# Patient Record
Sex: MALE | Race: WHITE | NOT HISPANIC OR LATINO | Employment: OTHER | URBAN - METROPOLITAN AREA
[De-identification: names, ages, dates, MRNs, and addresses within clinical notes are randomized per-mention and may not be internally consistent; named-entity substitution may affect disease eponyms.]

---

## 2017-02-02 ENCOUNTER — OFFICE VISIT (OUTPATIENT)
Dept: FAMILY MEDICINE | Facility: CLINIC | Age: 27
End: 2017-02-02
Payer: COMMERCIAL

## 2017-02-02 VITALS
SYSTOLIC BLOOD PRESSURE: 110 MMHG | WEIGHT: 235.1 LBS | OXYGEN SATURATION: 99 % | HEART RATE: 94 BPM | TEMPERATURE: 98.4 F | DIASTOLIC BLOOD PRESSURE: 60 MMHG | HEIGHT: 73 IN | BODY MASS INDEX: 31.16 KG/M2

## 2017-02-02 DIAGNOSIS — F98.8 ATTENTION DEFICIT DISORDER: Primary | ICD-10-CM

## 2017-02-02 DIAGNOSIS — F43.21 ADJUSTMENT DISORDER WITH DEPRESSED MOOD: ICD-10-CM

## 2017-02-02 PROCEDURE — 99213 OFFICE O/P EST LOW 20 MIN: CPT | Performed by: FAMILY MEDICINE

## 2017-02-02 RX ORDER — DEXTROAMPHETAMINE SACCHARATE, AMPHETAMINE ASPARTATE MONOHYDRATE, DEXTROAMPHETAMINE SULFATE AND AMPHETAMINE SULFATE 7.5; 7.5; 7.5; 7.5 MG/1; MG/1; MG/1; MG/1
30 CAPSULE, EXTENDED RELEASE ORAL DAILY
Qty: 30 CAPSULE | Refills: 0 | Status: SHIPPED | OUTPATIENT
Start: 2017-03-05 | End: 2017-04-04

## 2017-02-02 RX ORDER — DEXTROAMPHETAMINE SACCHARATE, AMPHETAMINE ASPARTATE MONOHYDRATE, DEXTROAMPHETAMINE SULFATE AND AMPHETAMINE SULFATE 7.5; 7.5; 7.5; 7.5 MG/1; MG/1; MG/1; MG/1
30 CAPSULE, EXTENDED RELEASE ORAL DAILY
Qty: 30 CAPSULE | Refills: 0 | Status: SHIPPED | OUTPATIENT
Start: 2017-04-05 | End: 2017-05-05

## 2017-02-02 RX ORDER — BUPROPION HYDROCHLORIDE 300 MG/1
300 TABLET ORAL EVERY MORNING
Qty: 30 TABLET | Refills: 11 | Status: SHIPPED | OUTPATIENT
Start: 2017-02-02 | End: 2017-12-11

## 2017-02-02 RX ORDER — DEXTROAMPHETAMINE SACCHARATE, AMPHETAMINE ASPARTATE MONOHYDRATE, DEXTROAMPHETAMINE SULFATE AND AMPHETAMINE SULFATE 7.5; 7.5; 7.5; 7.5 MG/1; MG/1; MG/1; MG/1
30 CAPSULE, EXTENDED RELEASE ORAL DAILY
Qty: 30 CAPSULE | Refills: 0 | Status: SHIPPED | OUTPATIENT
Start: 2017-02-02 | End: 2017-03-04

## 2017-02-02 NOTE — PROGRESS NOTES
"  SUBJECTIVE:                                                    All Barboza is a 26 year old male who presents to clinic today for the following health issues:    Medication Followup of Adderall 30 mg and Wellbutrin 300 mg    Taking Medication as prescribed: yes    Side Effects:  None    Medication Helping Symptoms:  yes       30mg daily does not seem to last as long  Insurance wont pay for 15mg BID    We tried switching him to Vyvanse but this was considerably more expensive    PHQ-9 SCORE 7/15/2016 11/3/2016 2/2/2017   Total Score - - -   Total Score 11 13 5       Family history of depression      Aggravating factors include: Attention to task  Relieving factors include: Past history of use of 15 mg long-acting Adderall twice a day  Records and U MP from 2011  Activity level/function:              Daily activities:  Able to do all daily activities            Work:  not applicable  Recent family or social stressors:  none noted  Psychologist:  No    History of chemical dependency:  No  Sleep:  Good  Depression/mood issues:  No  Anxiety issues YES     Problem list and histories reviewed & adjusted, as indicated.  Additional history: as documented    Patient Active Problem List   Diagnosis     Attention deficit disorder     No past surgical history on file.    Social History   Substance Use Topics     Smoking status: Never Smoker      Smokeless tobacco: Never Used     Alcohol Use: 0.0 oz/week     0 Standard drinks or equivalent per week      Comment: social      Family History   Problem Relation Age of Onset     Breast Cancer Mother      Depression Mother            ROS:  PSYCHIATRIC: NEGATIVE for changes in mood or affect  ROS otherwise negative    OBJECTIVE:                                                    /60 mmHg  Pulse 94  Temp(Src) 98.4  F (36.9  C) (Oral)  Ht 6' 1\" (1.854 m)  Wt 235 lb 1.6 oz (106.641 kg)  BMI 31.02 kg/m2  SpO2 99%  Body mass index is 31.02 kg/(m^2).  GENERAL: healthy, alert " and no distress  MS: no gross musculoskeletal defects noted, no edema    Diagnostic Test Results:  none      ASSESSMENT/PLAN:                                                      Problem List Items Addressed This Visit     Attention deficit disorder - Primary    Relevant Medications    amphetamine-dextroamphetamine (ADDERALL XR) 30 MG per 24 hr capsule    amphetamine-dextroamphetamine (ADDERALL XR) 30 MG per 24 hr capsule (Start on 3/5/2017)    amphetamine-dextroamphetamine (ADDERALL XR) 30 MG per 24 hr capsule (Start on 4/5/2017)      Other Visit Diagnoses     Adjustment disorder with depressed mood         Relevant Medications     buPROPion (WELLBUTRIN XL) 300 MG 24 hr tablet         Stable attention deficit disorder, back on Adderall    refill Medication for depression symptoms    Recheck in 4-12 weeks or as needed    Bj Correia MD  [facility]

## 2017-02-03 ASSESSMENT — PATIENT HEALTH QUESTIONNAIRE - PHQ9: SUM OF ALL RESPONSES TO PHQ QUESTIONS 1-9: 5

## 2017-08-25 ENCOUNTER — OFFICE VISIT (OUTPATIENT)
Dept: FAMILY MEDICINE | Facility: CLINIC | Age: 27
End: 2017-08-25
Payer: COMMERCIAL

## 2017-08-25 VITALS
HEART RATE: 96 BPM | TEMPERATURE: 98.7 F | DIASTOLIC BLOOD PRESSURE: 60 MMHG | WEIGHT: 236 LBS | RESPIRATION RATE: 16 BRPM | HEIGHT: 73 IN | OXYGEN SATURATION: 98 % | BODY MASS INDEX: 31.28 KG/M2 | SYSTOLIC BLOOD PRESSURE: 110 MMHG

## 2017-08-25 DIAGNOSIS — F98.8 ATTENTION DEFICIT DISORDER, UNSPECIFIED HYPERACTIVITY PRESENCE: Primary | ICD-10-CM

## 2017-08-25 DIAGNOSIS — F41.1 GENERALIZED ANXIETY DISORDER: ICD-10-CM

## 2017-08-25 PROCEDURE — 99214 OFFICE O/P EST MOD 30 MIN: CPT | Performed by: FAMILY MEDICINE

## 2017-08-25 RX ORDER — DEXTROAMPHETAMINE SACCHARATE, AMPHETAMINE ASPARTATE MONOHYDRATE, DEXTROAMPHETAMINE SULFATE AND AMPHETAMINE SULFATE 7.5; 7.5; 7.5; 7.5 MG/1; MG/1; MG/1; MG/1
30 CAPSULE, EXTENDED RELEASE ORAL DAILY
Qty: 30 CAPSULE | Refills: 0 | Status: SHIPPED | OUTPATIENT
Start: 2017-09-25 | End: 2017-10-25

## 2017-08-25 RX ORDER — DEXTROAMPHETAMINE SACCHARATE, AMPHETAMINE ASPARTATE MONOHYDRATE, DEXTROAMPHETAMINE SULFATE AND AMPHETAMINE SULFATE 7.5; 7.5; 7.5; 7.5 MG/1; MG/1; MG/1; MG/1
30 CAPSULE, EXTENDED RELEASE ORAL DAILY
Qty: 30 CAPSULE | Refills: 0 | Status: SHIPPED | OUTPATIENT
Start: 2017-08-25 | End: 2017-09-24

## 2017-08-25 RX ORDER — DEXTROAMPHETAMINE SACCHARATE, AMPHETAMINE ASPARTATE MONOHYDRATE, DEXTROAMPHETAMINE SULFATE AND AMPHETAMINE SULFATE 7.5; 7.5; 7.5; 7.5 MG/1; MG/1; MG/1; MG/1
30 CAPSULE, EXTENDED RELEASE ORAL DAILY
Qty: 30 CAPSULE | Refills: 0 | Status: SHIPPED | OUTPATIENT
Start: 2017-10-26 | End: 2017-11-25

## 2017-08-25 RX ORDER — CITALOPRAM HYDROBROMIDE 20 MG/1
20 TABLET ORAL AT BEDTIME
Qty: 30 TABLET | Refills: 3 | Status: SHIPPED | OUTPATIENT
Start: 2017-08-25 | End: 2017-12-11

## 2017-08-25 ASSESSMENT — PATIENT HEALTH QUESTIONNAIRE - PHQ9: SUM OF ALL RESPONSES TO PHQ QUESTIONS 1-9: 11

## 2017-08-25 NOTE — NURSING NOTE
"Chief Complaint   Patient presents with     A.D.H.D     Depression     initial /60 (BP Location: Right arm, Cuff Size: Adult Large)  Pulse 96  Temp 98.7  F (37.1  C) (Oral)  Resp 16  Ht 6' 1\" (1.854 m)  Wt 236 lb (107 kg)  SpO2 98%  BMI 31.14 kg/m2 Estimated body mass index is 31.14 kg/(m^2) as calculated from the following:    Height as of this encounter: 6' 1\" (1.854 m).    Weight as of this encounter: 236 lb (107 kg).  BP completed using cuff size: large.   R arm      Health Maintenance that is potentially due pending provider review:  NONE    n/a    Alexis Back ma  "

## 2017-08-25 NOTE — MR AVS SNAPSHOT
After Visit Summary   8/25/2017    All Barboza    MRN: [id number]           Patient Information     Date Of Birth          1990        Visit Information        Provider Department      8/25/2017 3:00 PM Bj Correia MD [facility]        Today's Diagnoses     Attention deficit disorder, unspecified hyperactivity presence    -  1    Generalized anxiety disorder           Follow-ups after your visit        Follow-up notes from your care team     Return in about 3 months (around 11/25/2017).      Who to contact     If you have questions or need follow up information about today's clinic visit or your schedule please contact [facility] directly at [phone].  Normal or non-critical lab and imaging results will be communicated to you by MyChart, letter or phone within 4 business days after the clinic has received the results. If you do not hear from us within 7 days, please contact the clinic through [employer] or phone. If you have a critical or abnormal lab result, we will notify you by phone as soon as possible.  Submit refill requests through [employer] or call your pharmacy and they will forward the refill request to us. Please allow 3 business days for your refill to be completed.          Additional Information About Your Visit        MyChart Information     [employer] gives you secure access to your electronic health record. If you see a primary care provider, you can also send messages to your care team and make appointments. If you have questions, please call your primary care clinic.  If you do not have a primary care provider, please call [phone] and they will assist you.        Care EveryWhere ID     This is your Care EveryWhere ID. This could be used by other organizations to access your [place] medical records  [id number]        Your Vitals Were     Pulse Temperature Respirations Height Pulse Oximetry BMI (Body Mass Index)    96 98.7  F (37.1  C)  "(Oral) 16 6' 1\" (1.854 m) 98% 31.14 kg/m2       Blood Pressure from Last 3 Encounters:   08/25/17 110/60   02/02/17 110/60   11/03/16 132/73    Weight from Last 3 Encounters:   08/25/17 236 lb (107 kg)   02/02/17 235 lb 1.6 oz (106.6 kg)   11/03/16 224 lb 8 oz (101.8 kg)              Today, you had the following     No orders found for display         Today's Medication Changes          These changes are accurate as of: 8/25/17  4:09 PM.  If you have any questions, ask your nurse or doctor.               Start taking these medicines.        Dose/Directions    * amphetamine-dextroamphetamine 30 MG per 24 hr capsule   Commonly known as:  ADDERALL XR   Used for:  Attention deficit disorder, unspecified hyperactivity presence   Started by:  Bj Correia MD        Dose:  30 mg   Take 1 capsule (30 mg) by mouth daily   Quantity:  30 capsule   Refills:  0       * amphetamine-dextroamphetamine 30 MG per 24 hr capsule   Commonly known as:  ADDERALL XR   Used for:  Attention deficit disorder, unspecified hyperactivity presence   Started by:  Bj Correia MD        Dose:  30 mg   Start taking on:  9/25/2017   Take 1 capsule (30 mg) by mouth daily   Quantity:  30 capsule   Refills:  0       * amphetamine-dextroamphetamine 30 MG per 24 hr capsule   Commonly known as:  ADDERALL XR   Used for:  Attention deficit disorder, unspecified hyperactivity presence   Started by:  Bj Correia MD        Dose:  30 mg   Start taking on:  10/26/2017   Take 1 capsule (30 mg) by mouth daily   Quantity:  30 capsule   Refills:  0       citalopram 20 MG tablet   Commonly known as:  celeXA   Used for:  Generalized anxiety disorder   Started by:  Bj Correia MD        Dose:  20 mg   Take 1 tablet (20 mg) by mouth At Bedtime   Quantity:  30 tablet   Refills:  3       * Notice:  This list has 3 medication(s) that are the same as other medications prescribed for you. Read the directions carefully, and ask " your doctor or other care provider to review them with you.         Where to get your medicines      These medications were sent to [id number] - [facility] [address]  [address] [address]     Phone:  [phone]     citalopram 20 MG tablet         Some of these will need a paper prescription and others can be bought over the counter.  Ask your nurse if you have questions.     Bring a paper prescription for each of these medications     amphetamine-dextroamphetamine 30 MG per 24 hr capsule    amphetamine-dextroamphetamine 30 MG per 24 hr capsule    amphetamine-dextroamphetamine 30 MG per 24 hr capsule                Primary Care Provider Office Phone # Fax #    Bj Markus Correia [phone] [address] [address]        Equal Access to Services     KING REZA : Hadii erinn white hadasho Somita, waaxda luqadaha, qaybta kaalmada adeegyada, dillon marie . So [facility] [phone].    ATENCIÓN: Si habla español, tiene a kathleen disposición servicios gratuitos de asistencia lingüística. Josr al [phone].    We comply with applicable federal civil rights laws and [place] laws. We do not discriminate on the basis of race, color, national origin, age, disability sex, sexual orientation or gender identity.            Thank you!     Thank you for choosing [facility]  for your care. Our goal is always to provide you with excellent care. Hearing back from our patients is one way we can continue to improve our services. Please take a few minutes to complete the written survey that you may receive in the mail after your visit with us. Thank you!             Your Updated Medication List - Protect others around you: Learn how to safely use, store and throw away your medicines at www.disposemymeds.org.          This list is accurate as of: 8/25/17  4:09 PM.  Always use your  most recent med list.                   Brand Name Dispense Instructions for use Diagnosis    * amphetamine-dextroamphetamine 30 MG per 24 hr capsule    ADDERALL XR    30 capsule    Take 1 capsule (30 mg) by mouth daily    Attention deficit disorder, unspecified hyperactivity presence       * amphetamine-dextroamphetamine 30 MG per 24 hr capsule   Start taking on:  9/25/2017    ADDERALL XR    30 capsule    Take 1 capsule (30 mg) by mouth daily    Attention deficit disorder, unspecified hyperactivity presence       * amphetamine-dextroamphetamine 30 MG per 24 hr capsule   Start taking on:  10/26/2017    ADDERALL XR    30 capsule    Take 1 capsule (30 mg) by mouth daily    Attention deficit disorder, unspecified hyperactivity presence       buPROPion 300 MG 24 hr tablet    WELLBUTRIN XL    30 tablet    Take 1 tablet (300 mg) by mouth every morning    Adjustment disorder with depressed mood       citalopram 20 MG tablet    celeXA    30 tablet    Take 1 tablet (20 mg) by mouth At Bedtime    Generalized anxiety disorder       * Notice:  This list has 3 medication(s) that are the same as other medications prescribed for you. Read the directions carefully, and ask your doctor or other care provider to review them with you.

## 2017-08-25 NOTE — PROGRESS NOTES
"  SUBJECTIVE:   All Barboza is a 27 year old male who presents to clinic today for the following health issues:      ADHD, and wellbutrin refill  Medication Followup of Adderall 30 mg and Wellbutrin 300 mg    Taking Medication as prescribed: yes    Side Effects:  None    Medication Helping Symptoms:  yes     30mg daily does not seem to last as long  Insurance wont pay for 15mg BID    We tried switching him to Vyvanse but this was considerably more expensive    PHQ-9 SCORE 7/15/2016 11/3/2016 2/2/2017   Total Score - - -   Total Score 11 13 5       Family history of depression  Worse anxiety recently, stopped Wellbutrin because he didn't think it was helpful      Aggravating factors include: Attention to task  Relieving factors include: Past history of use of 15 mg long-acting Adderall twice a day  Records and U MP from 2011  Activity level/function:              Daily activities:  Able to do all daily activities            Work:  not applicable  Recent family or social stressors:  none noted  Psychologist:  No    History of chemical dependency:  No  Sleep:  Good  Depression/mood issues:  No  Anxiety issues YES     Problem list and histories reviewed & adjusted, as indicated.  Additional history: as documented    Patient Active Problem List   Diagnosis     Attention deficit disorder     No past surgical history on file.    Social History   Substance Use Topics     Smoking status: Never Smoker     Smokeless tobacco: Never Used     Alcohol use 0.0 oz/week     0 Standard drinks or equivalent per week      Comment: social      Family History   Problem Relation Age of Onset     Breast Cancer Mother      Depression Mother            ROS:  PSYCHIATRIC: NEGATIVE for changes in mood or affect  ROS otherwise negative    OBJECTIVE:                                                    /60 (BP Location: Right arm, Cuff Size: Adult Large)  Pulse 96  Temp 98.7  F (37.1  C) (Oral)  Resp 16  Ht 6' 1\" (1.854 m)  Wt 236 lb " (107 kg)  SpO2 98%  BMI 31.14 kg/m2  Body mass index is 31.14 kg/(m^2).  GENERAL: healthy, alert and no distress  MS: no gross musculoskeletal defects noted, no edema  anxious  Diagnostic Test Results:  none      ASSESSMENT/PLAN:                                                      Problem List Items Addressed This Visit     None        Stable attention deficit disorder, back on Adderall    Try new med for anxiety symptoms    Recheck in 4-12 weeks or as needed    Bj Correia MD  [facility]

## 2017-12-11 ENCOUNTER — OFFICE VISIT (OUTPATIENT)
Dept: FAMILY MEDICINE | Facility: CLINIC | Age: 27
End: 2017-12-11
Payer: COMMERCIAL

## 2017-12-11 VITALS
HEART RATE: 92 BPM | RESPIRATION RATE: 15 BRPM | WEIGHT: 233.6 LBS | OXYGEN SATURATION: 98 % | BODY MASS INDEX: 30.96 KG/M2 | DIASTOLIC BLOOD PRESSURE: 68 MMHG | TEMPERATURE: 98.4 F | HEIGHT: 73 IN | SYSTOLIC BLOOD PRESSURE: 114 MMHG

## 2017-12-11 DIAGNOSIS — F98.8 ATTENTION DEFICIT DISORDER, UNSPECIFIED HYPERACTIVITY PRESENCE: Primary | ICD-10-CM

## 2017-12-11 DIAGNOSIS — F41.9 ANXIETY: ICD-10-CM

## 2017-12-11 PROCEDURE — 99213 OFFICE O/P EST LOW 20 MIN: CPT | Performed by: FAMILY MEDICINE

## 2017-12-11 RX ORDER — DEXTROAMPHETAMINE SACCHARATE, AMPHETAMINE ASPARTATE MONOHYDRATE, DEXTROAMPHETAMINE SULFATE AND AMPHETAMINE SULFATE 7.5; 7.5; 7.5; 7.5 MG/1; MG/1; MG/1; MG/1
30 CAPSULE, EXTENDED RELEASE ORAL DAILY
Qty: 30 CAPSULE | Refills: 0 | Status: SHIPPED | OUTPATIENT
Start: 2017-12-11 | End: 2018-01-10

## 2017-12-11 RX ORDER — DEXTROAMPHETAMINE SACCHARATE, AMPHETAMINE ASPARTATE MONOHYDRATE, DEXTROAMPHETAMINE SULFATE AND AMPHETAMINE SULFATE 7.5; 7.5; 7.5; 7.5 MG/1; MG/1; MG/1; MG/1
30 CAPSULE, EXTENDED RELEASE ORAL DAILY
Qty: 30 CAPSULE | Refills: 0 | Status: SHIPPED | OUTPATIENT
Start: 2018-02-11 | End: 2018-03-13

## 2017-12-11 RX ORDER — DEXTROAMPHETAMINE SACCHARATE, AMPHETAMINE ASPARTATE MONOHYDRATE, DEXTROAMPHETAMINE SULFATE AND AMPHETAMINE SULFATE 7.5; 7.5; 7.5; 7.5 MG/1; MG/1; MG/1; MG/1
30 CAPSULE, EXTENDED RELEASE ORAL DAILY
Qty: 30 CAPSULE | Refills: 0 | Status: SHIPPED | OUTPATIENT
Start: 2018-01-11 | End: 2018-02-10

## 2017-12-11 NOTE — MR AVS SNAPSHOT
"              After Visit Summary   12/11/2017    All Barboza    MRN: [id number]           Patient Information     Date Of Birth          1990        Visit Information        Provider Department      12/11/2017 3:30 PM Bj Correia MD [facility]        Today's Diagnoses     Attention deficit disorder, unspecified hyperactivity presence    -  1    Anxiety           Follow-ups after your visit        Who to contact     If you have questions or need follow up information about today's clinic visit or your schedule please contact [facility] directly at [phone].  Normal or non-critical lab and imaging results will be communicated to you by [employer], letter or phone within 4 business days after the clinic has received the results. If you do not hear from us within 7 days, please contact the clinic through [employer] or phone. If you have a critical or abnormal lab result, we will notify you by phone as soon as possible.  Submit refill requests through [employer] or call your pharmacy and they will forward the refill request to us. Please allow 3 business days for your refill to be completed.          Additional Information About Your Visit        MyChart Information     [employer] gives you secure access to your electronic health record. If you see a primary care provider, you can also send messages to your care team and make appointments. If you have questions, please call your primary care clinic.  If you do not have a primary care provider, please call [phone] and they will assist you.        Care EveryWhere ID     This is your Care EveryWhere ID. This could be used by other organizations to access your [place] medical records  [id number]        Your Vitals Were     Pulse Temperature Respirations Height Pulse Oximetry BMI (Body Mass Index)    92 98.4  F (36.9  C) (Oral) 15 6' 1\" (1.854 m) 98% 30.82 kg/m2       Blood Pressure from Last 3 Encounters:   12/11/17 114/68 "   08/25/17 110/60   02/02/17 110/60    Weight from Last 3 Encounters:   12/11/17 233 lb 9.6 oz (106 kg)   08/25/17 236 lb (107 kg)   02/02/17 235 lb 1.6 oz (106.6 kg)              Today, you had the following     No orders found for display         Today's Medication Changes          These changes are accurate as of: 12/11/17  3:53 PM.  If you have any questions, ask your nurse or doctor.               Start taking these medicines.        Dose/Directions    * amphetamine-dextroamphetamine 30 MG per 24 hr capsule   Commonly known as:  ADDERALL XR   Used for:  Attention deficit disorder, unspecified hyperactivity presence   Started by:  Bj Correia MD        Dose:  30 mg   Take 1 capsule (30 mg) by mouth daily   Quantity:  30 capsule   Refills:  0       * amphetamine-dextroamphetamine 30 MG per 24 hr capsule   Commonly known as:  ADDERALL XR   Used for:  Attention deficit disorder, unspecified hyperactivity presence   Started by:  Bj Correia MD        Dose:  30 mg   Start taking on:  1/11/2018   Take 1 capsule (30 mg) by mouth daily   Quantity:  30 capsule   Refills:  0       * amphetamine-dextroamphetamine 30 MG per 24 hr capsule   Commonly known as:  ADDERALL XR   Used for:  Attention deficit disorder, unspecified hyperactivity presence   Started by:  Bj Correia MD        Dose:  30 mg   Start taking on:  2/11/2018   Take 1 capsule (30 mg) by mouth daily   Quantity:  30 capsule   Refills:  0       * Notice:  This list has 3 medication(s) that are the same as other medications prescribed for you. Read the directions carefully, and ask your doctor or other care provider to review them with you.      Stop taking these medicines if you haven't already. Please contact your care team if you have questions.     buPROPion 300 MG 24 hr tablet   Commonly known as:  WELLBUTRIN XL   Stopped by:  Bj Correia MD           citalopram 20 MG tablet   Commonly known as:  celeXA    Stopped by:  Bj Correia MD                Where to get your medicines      Some of these will need a paper prescription and others can be bought over the counter.  Ask your nurse if you have questions.     Bring a paper prescription for each of these medications     amphetamine-dextroamphetamine 30 MG per 24 hr capsule    amphetamine-dextroamphetamine 30 MG per 24 hr capsule    amphetamine-dextroamphetamine 30 MG per 24 hr capsule                Primary Care Provider Office Phone # Fax #    Bj Markus Correia [phone] [address] [address]        Equal Access to Services     [facility]: Hadii aad ku hadasho Soomaali, waaxda luqadaha, qaybta kaalmada adeegyada, waxay nitain hayaan adeeg khpuma marie . So [facility] [phone].    ATENCIÓN: Si habla español, tiene a kathleen disposición servicios gratuitos de asistencia lingüística. [facility] [phone].    We comply with applicable federal civil rights laws and [place] laws. We do not discriminate on the basis of race, color, national origin, age, disability, sex, sexual orientation, or gender identity.            Thank you!     Thank you for choosing [facility]  for your care. Our goal is always to provide you with excellent care. Hearing back from our patients is one way we can continue to improve our services. Please take a few minutes to complete the written survey that you may receive in the mail after your visit with us. Thank you!             Your Updated Medication List - Protect others around you: Learn how to safely use, store and throw away your medicines at www.disposemymeds.org.          This list is accurate as of: 12/11/17  3:53 PM.  Always use your most recent med list.                   Brand Name Dispense Instructions for use Diagnosis    * amphetamine-dextroamphetamine 30 MG per 24 hr capsule    ADDERALL XR    30 capsule    Take 1 capsule (30 mg) by mouth daily     Attention deficit disorder, unspecified hyperactivity presence       * amphetamine-dextroamphetamine 30 MG per 24 hr capsule   Start taking on:  1/11/2018    ADDERALL XR    30 capsule    Take 1 capsule (30 mg) by mouth daily    Attention deficit disorder, unspecified hyperactivity presence       * amphetamine-dextroamphetamine 30 MG per 24 hr capsule   Start taking on:  2/11/2018    ADDERALL XR    30 capsule    Take 1 capsule (30 mg) by mouth daily    Attention deficit disorder, unspecified hyperactivity presence       * Notice:  This list has 3 medication(s) that are the same as other medications prescribed for you. Read the directions carefully, and ask your doctor or other care provider to review them with you.

## 2017-12-11 NOTE — NURSING NOTE
"Chief Complaint   Patient presents with     Recheck Medication     Adderall 30mg capsules       Initial /68  Pulse 92  Temp 98.4  F (36.9  C) (Oral)  Resp 15  Ht 6' 1\" (1.854 m)  Wt 233 lb 9.6 oz (106 kg)  SpO2 98%  BMI 30.82 kg/m2 Estimated body mass index is 30.82 kg/(m^2) as calculated from the following:    Height as of this encounter: 6' 1\" (1.854 m).    Weight as of this encounter: 233 lb 9.6 oz (106 kg).  Medication Reconciliation: complete    Health Maintenance Due Pending Provider Review:  NONE    n/a    Darby Manriquez MA  [facility]  "

## 2017-12-11 NOTE — PROGRESS NOTES
"  SUBJECTIVE:   All Barboza is a 27 year old male who presents to clinic today for the following health issues:    Medication Followup of Adderall 30 mg    Taking Medication as prescribed: yes    Side Effects:  None    Medication Helping Symptoms:  yes     30mg daily does not seem to last as long      PHQ-9 SCORE 11/3/2016 2/2/2017 8/25/2017   Total Score - - -   Total Score 13 5 11       Family history of depression  Worse anxiety recently, stopped Wellbutrin because he didn't think it was helpful      Aggravating factors include: Attention to task  Relieving factors include: Past history of use of 15 mg long-acting Adderall twice a day  Records and U MP from 2011  Activity level/function:              Daily activities:  Able to do all daily activities            Work:  not applicable  Recent family or social stressors:  none noted  Psychologist:  No    History of chemical dependency:  No  Sleep:  Good  Depression/mood issues:  No  Anxiety issues YES     Problem list and histories reviewed & adjusted, as indicated.  Additional history: as documented    Patient Active Problem List   Diagnosis     Attention deficit disorder     History reviewed. No pertinent surgical history.    Social History   Substance Use Topics     Smoking status: Never Smoker     Smokeless tobacco: Never Used     Alcohol use 0.0 oz/week     0 Standard drinks or equivalent per week      Comment: social      Family History   Problem Relation Age of Onset     Breast Cancer Mother      Depression Mother            ROS:  PSYCHIATRIC: NEGATIVE for changes in mood or affect  ROS otherwise negative    OBJECTIVE:                                                    /68  Pulse 92  Temp 98.4  F (36.9  C) (Oral)  Resp 15  Ht 6' 1\" (1.854 m)  Wt 233 lb 9.6 oz (106 kg)  SpO2 98%  BMI 30.82 kg/m2  Body mass index is 30.82 kg/(m^2).  GENERAL: healthy, alert and no distress  MS: no gross musculoskeletal defects noted, no " edema  Anxious    Diagnostic Test Results:  none      ASSESSMENT/PLAN:                                                    1. Attention deficit disorder, unspecified hyperactivity presence    - amphetamine-dextroamphetamine (ADDERALL XR) 30 MG per 24 hr capsule; Take 1 capsule (30 mg) by mouth daily  Dispense: 30 capsule; Refill: 0  - amphetamine-dextroamphetamine (ADDERALL XR) 30 MG per 24 hr capsule; Take 1 capsule (30 mg) by mouth daily  Dispense: 30 capsule; Refill: 0  - amphetamine-dextroamphetamine (ADDERALL XR) 30 MG per 24 hr capsule; Take 1 capsule (30 mg) by mouth daily  Dispense: 30 capsule; Refill: 0    Stable attention deficit disorder, back on Adderall    2. Anxiety  Still has anxiety but discontinued citalopram  He did not really find it helpful  He does not want to try anything else at this time    Recheck in 3-4 months weeks or as needed    Bj Correia MD  [facility]

## 2018-03-15 ENCOUNTER — OFFICE VISIT (OUTPATIENT)
Dept: FAMILY MEDICINE | Facility: CLINIC | Age: 28
End: 2018-03-15
Payer: COMMERCIAL

## 2018-03-15 VITALS
HEART RATE: 89 BPM | SYSTOLIC BLOOD PRESSURE: 108 MMHG | WEIGHT: 217.7 LBS | HEIGHT: 73 IN | BODY MASS INDEX: 28.85 KG/M2 | TEMPERATURE: 98 F | DIASTOLIC BLOOD PRESSURE: 70 MMHG | OXYGEN SATURATION: 99 %

## 2018-03-15 DIAGNOSIS — F98.8 ATTENTION DEFICIT DISORDER, UNSPECIFIED HYPERACTIVITY PRESENCE: ICD-10-CM

## 2018-03-15 PROCEDURE — 99213 OFFICE O/P EST LOW 20 MIN: CPT | Performed by: FAMILY MEDICINE

## 2018-03-15 RX ORDER — DEXTROAMPHETAMINE SACCHARATE, AMPHETAMINE ASPARTATE MONOHYDRATE, DEXTROAMPHETAMINE SULFATE AND AMPHETAMINE SULFATE 7.5; 7.5; 7.5; 7.5 MG/1; MG/1; MG/1; MG/1
30 CAPSULE, EXTENDED RELEASE ORAL DAILY
Qty: 30 CAPSULE | Refills: 0 | Status: SHIPPED | OUTPATIENT
Start: 2018-03-15 | End: 2018-04-14

## 2018-03-15 RX ORDER — DEXTROAMPHETAMINE SACCHARATE, AMPHETAMINE ASPARTATE MONOHYDRATE, DEXTROAMPHETAMINE SULFATE AND AMPHETAMINE SULFATE 7.5; 7.5; 7.5; 7.5 MG/1; MG/1; MG/1; MG/1
30 CAPSULE, EXTENDED RELEASE ORAL DAILY
Qty: 30 CAPSULE | Refills: 0 | Status: SHIPPED | OUTPATIENT
Start: 2018-04-15 | End: 2018-05-15

## 2018-03-15 RX ORDER — DEXTROAMPHETAMINE SACCHARATE, AMPHETAMINE ASPARTATE MONOHYDRATE, DEXTROAMPHETAMINE SULFATE AND AMPHETAMINE SULFATE 7.5; 7.5; 7.5; 7.5 MG/1; MG/1; MG/1; MG/1
30 CAPSULE, EXTENDED RELEASE ORAL DAILY
Qty: 30 CAPSULE | Refills: 0 | Status: SHIPPED | OUTPATIENT
Start: 2018-05-16 | End: 2018-06-15

## 2018-03-15 NOTE — NURSING NOTE
"Chief Complaint   Patient presents with     Recheck Medication     adderall     /70  Pulse 89  Temp 98  F (36.7  C) (Oral)  Ht 6' 1\" (1.854 m)  Wt 217 lb 11.2 oz (98.7 kg)  SpO2 99%  BMI 28.72 kg/m2 Estimated body mass index is 28.72 kg/(m^2) as calculated from the following:    Height as of this encounter: 6' 1\" (1.854 m).    Weight as of this encounter: 217 lb 11.2 oz (98.7 kg).  Medication Reconciliation: complete      Health Maintenance due pending provider review:  NONE    n/a    Elise Gray CMA  "

## 2018-03-15 NOTE — PROGRESS NOTES
"  SUBJECTIVE:   All Barboza is a 27 year old male who presents to clinic today for the following health issues:        Medication Followup of Adderall 30 mg    Taking Medication as prescribed: yes    Side Effects:  None    Medication Helping Symptoms:  yes     30mg daily does not seem to last as long      PHQ-9 SCORE 11/3/2016 2/2/2017 8/25/2017   Total Score - - -   Total Score 13 5 11       Family history of depression  Worse anxiety recently, stopped Wellbutrin because he didn't think it was helpful      Aggravating factors include: Attention to task  Relieving factors include: Past history of use of 15 mg long-acting Adderall twice a day  Records and U MP from 2011  Activity level/function:              Daily activities:  Able to do all daily activities            Work:  not applicable  Recent family or social stressors:  none noted  Psychologist:  No    History of chemical dependency:  No  Sleep:  Good  Depression/mood issues:  No  Anxiety issues YES     Problem list and histories reviewed & adjusted, as indicated.  Additional history: as documented    Patient Active Problem List   Diagnosis     Attention deficit disorder     Anxiety     No past surgical history on file.    Social History   Substance Use Topics     Smoking status: Never Smoker     Smokeless tobacco: Never Used     Alcohol use 0.0 oz/week     0 Standard drinks or equivalent per week      Comment: social      Family History   Problem Relation Age of Onset     Breast Cancer Mother      Depression Mother            ROS:  PSYCHIATRIC: NEGATIVE for changes in mood or affect  ROS otherwise negative    OBJECTIVE:                                                    /70  Pulse 89  Temp 98  F (36.7  C) (Oral)  Ht 6' 1\" (1.854 m)  Wt 217 lb 11.2 oz (98.7 kg)  SpO2 99%  BMI 28.72 kg/m2  Body mass index is 28.72 kg/(m^2).  GENERAL: healthy, alert and no distress  MS: no gross musculoskeletal defects noted, no edema  Anxious    Diagnostic Test " Results:  none      ASSESSMENT/PLAN:                                                    1. Attention deficit disorder, unspecified hyperactivity presence    - amphetamine-dextroamphetamine (ADDERALL XR) 30 MG per 24 hr capsule; Take 1 capsule (30 mg) by mouth daily  Dispense: 30 capsule; Refill: 0  - amphetamine-dextroamphetamine (ADDERALL XR) 30 MG per 24 hr capsule; Take 1 capsule (30 mg) by mouth daily  Dispense: 30 capsule; Refill: 0  - amphetamine-dextroamphetamine (ADDERALL XR) 30 MG per 24 hr capsule; Take 1 capsule (30 mg) by mouth daily  Dispense: 30 capsule; Refill: 0    Stable attention deficit disorder,    discussed patient following up with a 3 month E visit or phone visit  And six-month face-to-face visit      Bj Correia MD  [facility]

## 2018-03-15 NOTE — MR AVS SNAPSHOT
"              After Visit Summary   3/15/2018    All Barboza    MRN: [id number]           Patient Information     Date Of Birth          1990        Visit Information        Provider Department      3/15/2018 2:30 PM Bj Correia MD [facility]        Today's Diagnoses     Attention deficit disorder, unspecified hyperactivity presence           Follow-ups after your visit        Follow-up notes from your care team     Return in about 6 months (around 9/15/2018) for phone or evisit 3 months.      Who to contact     If you have questions or need follow up information about today's clinic visit or your schedule please contact [facility] directly at [phone].  Normal or non-critical lab and imaging results will be communicated to you by MyChart, letter or phone within 4 business days after the clinic has received the results. If you do not hear from us within 7 days, please contact the clinic through WEbookhart or phone. If you have a critical or abnormal lab result, we will notify you by phone as soon as possible.  Submit refill requests through [employer] or call your pharmacy and they will forward the refill request to us. Please allow 3 business days for your refill to be completed.          Additional Information About Your Visit        MyChart Information     [employer] gives you secure access to your electronic health record. If you see a primary care provider, you can also send messages to your care team and make appointments. If you have questions, please call your primary care clinic.  If you do not have a primary care provider, please call [phone] and they will assist you.        Care EveryWhere ID     This is your Care EveryWhere ID. This could be used by other organizations to access your [facility] medical records  [id number]        Your Vitals Were     Pulse Temperature Height Pulse Oximetry BMI (Body Mass Index)       89 98  F (36.7  C) (Oral) 6' 1\" (1.854 m) " 99% 28.72 kg/m2        Blood Pressure from Last 3 Encounters:   03/15/18 108/70   12/11/17 114/68   08/25/17 110/60    Weight from Last 3 Encounters:   03/15/18 217 lb 11.2 oz (98.7 kg)   12/11/17 233 lb 9.6 oz (106 kg)   08/25/17 236 lb (107 kg)              Today, you had the following     No orders found for display         Today's Medication Changes          These changes are accurate as of 3/15/18  2:54 PM.  If you have any questions, ask your nurse or doctor.               Start taking these medicines.        Dose/Directions    * amphetamine-dextroamphetamine 30 MG per 24 hr capsule   Commonly known as:  ADDERALL XR   Used for:  Attention deficit disorder, unspecified hyperactivity presence   Started by:  Bj Correia MD        Dose:  30 mg   Take 1 capsule (30 mg) by mouth daily   Quantity:  30 capsule   Refills:  0       * amphetamine-dextroamphetamine 30 MG per 24 hr capsule   Commonly known as:  ADDERALL XR   Used for:  Attention deficit disorder, unspecified hyperactivity presence   Started by:  Bj Correia MD        Dose:  30 mg   Start taking on:  4/15/2018   Take 1 capsule (30 mg) by mouth daily   Quantity:  30 capsule   Refills:  0       * amphetamine-dextroamphetamine 30 MG per 24 hr capsule   Commonly known as:  ADDERALL XR   Used for:  Attention deficit disorder, unspecified hyperactivity presence   Started by:  Bj Correia MD        Dose:  30 mg   Start taking on:  5/16/2018   Take 1 capsule (30 mg) by mouth daily   Quantity:  30 capsule   Refills:  0       * Notice:  This list has 3 medication(s) that are the same as other medications prescribed for you. Read the directions carefully, and ask your doctor or other care provider to review them with you.         Where to get your medicines      Some of these will need a paper prescription and others can be bought over the counter.  Ask your nurse if you have questions.     Bring a paper prescription for each of  these medications     amphetamine-dextroamphetamine 30 MG per 24 hr capsule    amphetamine-dextroamphetamine 30 MG per 24 hr capsule    amphetamine-dextroamphetamine 30 MG per 24 hr capsule                Primary Care Provider Office Phone # Fax #    Bj Markus Correia [phone] [address] [facility] [address]        Equal Access to Services     [facility]: Hadii aad ku hadasho Soomaali, waaxda luqadaha, qaybta kaalmada adeegyada, waxay idiin hayaan adeeg kharash la'aan . So [facility] [phone].    ATENCIÓN: Si habla español, tiene a kathleen disposición servicios gratuitos de asistencia lingüística. Neftaliame al [phone].    We comply with applicable federal civil rights laws and [place] laws. We do not discriminate on the basis of race, color, national origin, age, disability, sex, sexual orientation, or gender identity.            Thank you!     Thank you for choosing [facility]  for your care. Our goal is always to provide you with excellent care. Hearing back from our patients is one way we can continue to improve our services. Please take a few minutes to complete the written survey that you may receive in the mail after your visit with us. Thank you!             Your Updated Medication List - Protect others around you: Learn how to safely use, store and throw away your medicines at www.disposemymeds.org.          This list is accurate as of 3/15/18  2:54 PM.  Always use your most recent med list.                   Brand Name Dispense Instructions for use Diagnosis    * amphetamine-dextroamphetamine 30 MG per 24 hr capsule    ADDERALL XR    30 capsule    Take 1 capsule (30 mg) by mouth daily    Attention deficit disorder, unspecified hyperactivity presence       * amphetamine-dextroamphetamine 30 MG per 24 hr capsule   Start taking on:  4/15/2018    ADDERALL XR    30 capsule    Take 1 capsule (30 mg) by mouth daily    Attention deficit disorder, unspecified  hyperactivity presence       * amphetamine-dextroamphetamine 30 MG per 24 hr capsule   Start taking on:  5/16/2018    ADDERALL XR    30 capsule    Take 1 capsule (30 mg) by mouth daily    Attention deficit disorder, unspecified hyperactivity presence       * Notice:  This list has 3 medication(s) that are the same as other medications prescribed for you. Read the directions carefully, and ask your doctor or other care provider to review them with you.

## 2018-07-10 ENCOUNTER — E-VISIT (OUTPATIENT)
Dept: FAMILY MEDICINE | Facility: CLINIC | Age: 28
End: 2018-07-10

## 2018-07-10 DIAGNOSIS — F98.8 ATTENTION DEFICIT DISORDER (ADD) WITHOUT HYPERACTIVITY: Primary | ICD-10-CM

## 2018-07-10 PROCEDURE — 99444 ZZC PHYSICIAN ONLINE EVALUATION & MANAGEMENT SERVICE: CPT | Performed by: FAMILY MEDICINE

## 2018-07-10 NOTE — MR AVS SNAPSHOT
After Visit Summary   7/10/2018    All Barboza    MRN: [id number]           Patient Information     Date Of Birth          1990        Visit Information        Provider Department      7/10/2018 2:59 PM Bj Correia MD [facility]        Today's Diagnoses     Attention deficit disorder (ADD) without hyperactivity    -  1       Follow-ups after your visit        Who to contact     If you have questions or need follow up information about today's clinic visit or your schedule please contact [facility] directly at [phone].  Normal or non-critical lab and imaging results will be communicated to you by SourceTourhart, letter or phone within 4 business days after the clinic has received the results. If you do not hear from us within 7 days, please contact the clinic through SourceTourhart or phone. If you have a critical or abnormal lab result, we will notify you by phone as soon as possible.  Submit refill requests through [employer] or call your pharmacy and they will forward the refill request to us. Please allow 3 business days for your refill to be completed.          Additional Information About Your Visit        MyChart Information     [employer] gives you secure access to your electronic health record. If you see a primary care provider, you can also send messages to your care team and make appointments. If you have questions, please call your primary care clinic.  If you do not have a primary care provider, please call [phone] and they will assist you.        Care EveryWhere ID     This is your Care EveryWhere ID. This could be used by other organizations to access your [facility] medical records  [id number]         Blood Pressure from Last 3 Encounters:   03/15/18 108/70   12/11/17 114/68   08/25/17 110/60    Weight from Last 3 Encounters:   03/15/18 217 lb 11.2 oz (98.7 kg)   12/11/17 233 lb 9.6 oz (106 kg)   08/25/17 236 lb (107 kg)              Today, you had  the following     No orders found for display         Today's Medication Changes          These changes are accurate as of 7/10/18 11:59 PM.  If you have any questions, ask your nurse or doctor.               Start taking these medicines.        Dose/Directions    * amphetamine-dextroamphetamine 30 MG per 24 hr capsule   Commonly known as:  ADDERALL XR   Used for:  Attention deficit disorder (ADD) without hyperactivity        Dose:  30 mg   Take 1 capsule (30 mg) by mouth daily   Quantity:  30 capsule   Refills:  0       * amphetamine-dextroamphetamine 30 MG per 24 hr capsule   Commonly known as:  ADDERALL XR   Used for:  Attention deficit disorder (ADD) without hyperactivity        Dose:  30 mg   Start taking on:  8/12/2018   Take 1 capsule (30 mg) by mouth daily   Quantity:  30 capsule   Refills:  0       * amphetamine-dextroamphetamine 30 MG per 24 hr capsule   Commonly known as:  ADDERALL XR   Used for:  Attention deficit disorder (ADD) without hyperactivity        Dose:  30 mg   Start taking on:  9/12/2018   Take 1 capsule (30 mg) by mouth daily   Quantity:  30 capsule   Refills:  0       * Notice:  This list has 3 medication(s) that are the same as other medications prescribed for you. Read the directions carefully, and ask your doctor or other care provider to review them with you.         Where to get your medicines      Some of these will need a paper prescription and others can be bought over the counter.  Ask your nurse if you have questions.     Bring a paper prescription for each of these medications     amphetamine-dextroamphetamine 30 MG per 24 hr capsule    amphetamine-dextroamphetamine 30 MG per 24 hr capsule    amphetamine-dextroamphetamine 30 MG per 24 hr capsule                Primary Care Provider Office Phone # Fax #    Bj Markus Correia [phone] [address] [facility] [address]        Equal Access to Services     [facility]: Kimmy banks  Somita, waarnaldoda luqadaha, qaelizabethta kaalmada beverley, dillon stollyadira krysta. So [facility] [phone].    ATENCIÓN: Si lori pradhan, tiene a kathleen disposición servicios gratuitos de asistencia lingüística. Josr al [phone].    We comply with applicable federal civil rights laws and [place] laws. We do not discriminate on the basis of race, color, national origin, age, disability, sex, sexual orientation, or gender identity.            Thank you!     Thank you for choosing [facility]  for your care. Our goal is always to provide you with excellent care. Hearing back from our patients is one way we can continue to improve our services. Please take a few minutes to complete the written survey that you may receive in the mail after your visit with us. Thank you!             Your Updated Medication List - Protect others around you: Learn how to safely use, store and throw away your medicines at www.disposemymeds.org.          This list is accurate as of 7/10/18 11:59 PM.  Always use your most recent med list.                   Brand Name Dispense Instructions for use Diagnosis    * amphetamine-dextroamphetamine 30 MG per 24 hr capsule    ADDERALL XR    30 capsule    Take 1 capsule (30 mg) by mouth daily    Attention deficit disorder (ADD) without hyperactivity       * amphetamine-dextroamphetamine 30 MG per 24 hr capsule   Start taking on:  8/12/2018    ADDERALL XR    30 capsule    Take 1 capsule (30 mg) by mouth daily    Attention deficit disorder (ADD) without hyperactivity       * amphetamine-dextroamphetamine 30 MG per 24 hr capsule   Start taking on:  9/12/2018    ADDERALL XR    30 capsule    Take 1 capsule (30 mg) by mouth daily    Attention deficit disorder (ADD) without hyperactivity       * Notice:  This list has 3 medication(s) that are the same as other medications prescribed for you. Read the directions carefully, and ask your doctor or other care provider to review them with  you.

## 2018-07-12 RX ORDER — DEXTROAMPHETAMINE SACCHARATE, AMPHETAMINE ASPARTATE MONOHYDRATE, DEXTROAMPHETAMINE SULFATE AND AMPHETAMINE SULFATE 7.5; 7.5; 7.5; 7.5 MG/1; MG/1; MG/1; MG/1
30 CAPSULE, EXTENDED RELEASE ORAL DAILY
Qty: 30 CAPSULE | Refills: 0 | Status: SHIPPED | OUTPATIENT
Start: 2018-09-12 | End: 2018-10-12

## 2018-07-12 RX ORDER — DEXTROAMPHETAMINE SACCHARATE, AMPHETAMINE ASPARTATE MONOHYDRATE, DEXTROAMPHETAMINE SULFATE AND AMPHETAMINE SULFATE 7.5; 7.5; 7.5; 7.5 MG/1; MG/1; MG/1; MG/1
30 CAPSULE, EXTENDED RELEASE ORAL DAILY
Qty: 30 CAPSULE | Refills: 0 | Status: SHIPPED | OUTPATIENT
Start: 2018-07-12 | End: 2018-08-11

## 2018-07-12 RX ORDER — DEXTROAMPHETAMINE SACCHARATE, AMPHETAMINE ASPARTATE MONOHYDRATE, DEXTROAMPHETAMINE SULFATE AND AMPHETAMINE SULFATE 7.5; 7.5; 7.5; 7.5 MG/1; MG/1; MG/1; MG/1
30 CAPSULE, EXTENDED RELEASE ORAL DAILY
Qty: 30 CAPSULE | Refills: 0 | Status: SHIPPED | OUTPATIENT
Start: 2018-08-12 | End: 2018-09-11

## 2018-07-12 NOTE — TELEPHONE ENCOUNTER
Reviewed [place] prescription drug registry. No concerning patterns of Rx use in this state in the past year that I can find    Approved for 3 months

## 2018-10-12 ENCOUNTER — OFFICE VISIT (OUTPATIENT)
Dept: FAMILY MEDICINE | Facility: CLINIC | Age: 28
End: 2018-10-12

## 2018-10-12 VITALS
OXYGEN SATURATION: 98 % | DIASTOLIC BLOOD PRESSURE: 56 MMHG | SYSTOLIC BLOOD PRESSURE: 108 MMHG | WEIGHT: 217 LBS | HEIGHT: 73 IN | TEMPERATURE: 98.1 F | BODY MASS INDEX: 28.76 KG/M2 | HEART RATE: 56 BPM | RESPIRATION RATE: 14 BRPM

## 2018-10-12 DIAGNOSIS — F98.8 ATTENTION DEFICIT DISORDER (ADD) WITHOUT HYPERACTIVITY: ICD-10-CM

## 2018-10-12 PROCEDURE — 99213 OFFICE O/P EST LOW 20 MIN: CPT | Performed by: FAMILY MEDICINE

## 2018-10-12 RX ORDER — DEXTROAMPHETAMINE SACCHARATE, AMPHETAMINE ASPARTATE MONOHYDRATE, DEXTROAMPHETAMINE SULFATE AND AMPHETAMINE SULFATE 7.5; 7.5; 7.5; 7.5 MG/1; MG/1; MG/1; MG/1
30 CAPSULE, EXTENDED RELEASE ORAL DAILY
Qty: 30 CAPSULE | Refills: 0 | Status: SHIPPED | OUTPATIENT
Start: 2018-10-12 | End: 2019-04-12

## 2018-10-12 RX ORDER — DEXTROAMPHETAMINE SACCHARATE, AMPHETAMINE ASPARTATE MONOHYDRATE, DEXTROAMPHETAMINE SULFATE AND AMPHETAMINE SULFATE 7.5; 7.5; 7.5; 7.5 MG/1; MG/1; MG/1; MG/1
30 CAPSULE, EXTENDED RELEASE ORAL DAILY
Qty: 30 CAPSULE | Refills: 0 | Status: SHIPPED | OUTPATIENT
Start: 2018-12-13 | End: 2019-04-12

## 2018-10-12 RX ORDER — DEXTROAMPHETAMINE SACCHARATE, AMPHETAMINE ASPARTATE MONOHYDRATE, DEXTROAMPHETAMINE SULFATE AND AMPHETAMINE SULFATE 7.5; 7.5; 7.5; 7.5 MG/1; MG/1; MG/1; MG/1
30 CAPSULE, EXTENDED RELEASE ORAL DAILY
Qty: 30 CAPSULE | Refills: 0 | Status: CANCELLED | OUTPATIENT
Start: 2018-10-12

## 2018-10-12 RX ORDER — DEXTROAMPHETAMINE SACCHARATE, AMPHETAMINE ASPARTATE MONOHYDRATE, DEXTROAMPHETAMINE SULFATE AND AMPHETAMINE SULFATE 7.5; 7.5; 7.5; 7.5 MG/1; MG/1; MG/1; MG/1
30 CAPSULE, EXTENDED RELEASE ORAL DAILY
Qty: 30 CAPSULE | Refills: 0 | Status: SHIPPED | OUTPATIENT
Start: 2018-11-12 | End: 2019-04-12

## 2018-10-12 NOTE — PROGRESS NOTES
"  SUBJECTIVE:   All Barboza is a 28 year old male who presents to clinic today for the following health issues:        Medication Followup of Adderall 30 mg    Taking Medication as prescribed: yes    Side Effects:  None    Medication Helping Symptoms:  yes     30mg daily does not seem to last as long      PHQ-9 SCORE 11/3/2016 2/2/2017 8/25/2017   Total Score - - -   Total Score 13 5 11       Family history of depression        Aggravating factors include: Attention to task  Relieving factors include: Past history of use of 15 mg long-acting Adderall twice a day  Records and U MP from 2011  Activity level/function:              Daily activities:  Able to do all daily activities            Work:  not applicable  Recent family or social stressors:  none noted  Psychologist:  No    History of chemical dependency:  No  Sleep:  Good  Depression/mood issues:  No  Anxiety issues YES     Problem list and histories reviewed & adjusted, as indicated.  Additional history: as documented    Patient Active Problem List   Diagnosis     Attention deficit disorder (ADD) without hyperactivity     Anxiety     History reviewed. No pertinent surgical history.    Social History   Substance Use Topics     Smoking status: Never Smoker     Smokeless tobacco: Never Used     Alcohol use 0.0 oz/week     0 Standard drinks or equivalent per week      Comment: social      Family History   Problem Relation Age of Onset     Breast Cancer Mother      Depression Mother            ROS:  PSYCHIATRIC: NEGATIVE for changes in mood or affect  ROS otherwise negative    OBJECTIVE:                                                    /56  Pulse 56  Temp 98.1  F (36.7  C) (Oral)  Resp 14  Ht 6' 1\" (1.854 m)  Wt 217 lb (98.4 kg)  SpO2 98%  BMI 28.63 kg/m2  Body mass index is 28.63 kg/(m^2).  GENERAL: healthy, alert and no distress  MS: no gross musculoskeletal defects noted, no edema  Anxious    Diagnostic Test Results:  none  "     ASSESSMENT/PLAN:                                                        Stable attention deficit disorder,    discussed patient following up with a 3 month E visit or phone visit  And six-month face-to-face visit    Sounds like he is getting a physical at an [facility] clinic closer to where he lives, he might transfer care over there    Bj Correia MD  [facility]

## 2018-10-12 NOTE — MR AVS SNAPSHOT
"              After Visit Summary   10/12/2018    All Barboza    MRN: [id number]           Patient Information     Date Of Birth          1990        Visit Information        Provider Department      10/12/2018 12:00 PM Bj Correia MD [facility]        Today's Diagnoses     Attention deficit disorder (ADD) without hyperactivity           Follow-ups after your visit        Who to contact     If you have questions or need follow up information about today's clinic visit or your schedule please contact [facility] directly at [phone].  Normal or non-critical lab and imaging results will be communicated to you by [employer], letter or phone within 4 business days after the clinic has received the results. If you do not hear from us within 7 days, please contact the clinic through Sen[employer] or phone. If you have a critical or abnormal lab result, we will notify you by phone as soon as possible.  Submit refill requests through [employer]it or call your pharmacy and they will forward the refill request to us. Please allow 3 business days for your refill to be completed.          Additional Information About Your Visit        MyChart Information     [employer] gives you secure access to your electronic health record. If you see a primary care provider, you can also send messages to your care team and make appointments. If you have questions, please call your primary care clinic.  If you do not have a primary care provider, please call [phone] and they will assist you.        Care EveryWhere ID     This is your Care EveryWhere ID. This could be used by other organizations to access your [place] medical records  [id number]        Your Vitals Were     Pulse Temperature Respirations Height Pulse Oximetry BMI (Body Mass Index)    56 98.1  F (36.7  C) (Oral) 14 6' 1\" (1.854 m) 98% 28.63 kg/m2       Blood Pressure from Last 3 Encounters:   10/12/18 108/56   03/15/18 108/70   12/11/17 " 114/68    Weight from Last 3 Encounters:   10/12/18 217 lb (98.4 kg)   03/15/18 217 lb 11.2 oz (98.7 kg)   12/11/17 233 lb 9.6 oz (106 kg)              Today, you had the following     No orders found for display         Today's Medication Changes          These changes are accurate as of 10/12/18 12:30 PM.  If you have any questions, ask your nurse or doctor.               These medicines have changed or have updated prescriptions.        Dose/Directions    * amphetamine-dextroamphetamine 30 MG per 24 hr capsule   Commonly known as:  ADDERALL XR   This may have changed:  Another medication with the same name was added. Make sure you understand how and when to take each.   Used for:  Attention deficit disorder (ADD) without hyperactivity   Changed by:  Bj Correia MD        Dose:  30 mg   Take 1 capsule (30 mg) by mouth daily   Quantity:  30 capsule   Refills:  0       * amphetamine-dextroamphetamine 30 MG per 24 hr capsule   Commonly known as:  ADDERALL XR   This may have changed:  You were already taking a medication with the same name, and this prescription was added. Make sure you understand how and when to take each.   Used for:  Attention deficit disorder (ADD) without hyperactivity   Changed by:  Bj Correia MD        Dose:  30 mg   Take 1 capsule (30 mg) by mouth daily   Quantity:  30 capsule   Refills:  0       * amphetamine-dextroamphetamine 30 MG per 24 hr capsule   Commonly known as:  ADDERALL XR   This may have changed:  You were already taking a medication with the same name, and this prescription was added. Make sure you understand how and when to take each.   Used for:  Attention deficit disorder (ADD) without hyperactivity   Changed by:  Bj Correia MD        Dose:  30 mg   Start taking on:  11/12/2018   Take 1 capsule (30 mg) by mouth daily   Quantity:  30 capsule   Refills:  0       * amphetamine-dextroamphetamine 30 MG per 24 hr capsule   Commonly known as:   ADDERALL XR   This may have changed:  You were already taking a medication with the same name, and this prescription was added. Make sure you understand how and when to take each.   Used for:  Attention deficit disorder (ADD) without hyperactivity   Changed by:  Bj Correia MD        Dose:  30 mg   Start taking on:  12/13/2018   Take 1 capsule (30 mg) by mouth daily   Quantity:  30 capsule   Refills:  0       * Notice:  This list has 4 medication(s) that are the same as other medications prescribed for you. Read the directions carefully, and ask your doctor or other care provider to review them with you.         Where to get your medicines      Some of these will need a paper prescription and others can be bought over the counter.  Ask your nurse if you have questions.     Bring a paper prescription for each of these medications     amphetamine-dextroamphetamine 30 MG per 24 hr capsule    amphetamine-dextroamphetamine 30 MG per 24 hr capsule    amphetamine-dextroamphetamine 30 MG per 24 hr capsule                Primary Care Provider Office Phone # Fax #    Bj Correia [phone] [address] [address]        Equal Access to Services     [facility]: Hadii erinn ku hadasho Somita, waaxda luqadaha, qaybta kaalmada beverley, dillon marie . [facility] [phone].    ATENCIÓN: Si habla español, tiene a kathleen disposición servicios gratuitos de asistencia lingüística. [facility] [phone].    We comply with applicable federal civil rights laws and [place] laws. We do not discriminate on the basis of race, color, national origin, age, disability, sex, sexual orientation, or gender identity.            Thank you!     Thank you for choosing [facility]  for your care. Our goal is always to provide you with excellent care. Hearing back from our patients is one way we can continue to improve our services. Please take a  few minutes to complete the written survey that you may receive in the mail after your visit with us. Thank you!             Your Updated Medication List - Protect others around you: Learn how to safely use, store and throw away your medicines at www.disposemymeds.org.          This list is accurate as of 10/12/18 12:30 PM.  Always use your most recent med list.                   Brand Name Dispense Instructions for use Diagnosis    * amphetamine-dextroamphetamine 30 MG per 24 hr capsule    ADDERALL XR    30 capsule    Take 1 capsule (30 mg) by mouth daily    Attention deficit disorder (ADD) without hyperactivity       * amphetamine-dextroamphetamine 30 MG per 24 hr capsule    ADDERALL XR    30 capsule    Take 1 capsule (30 mg) by mouth daily    Attention deficit disorder (ADD) without hyperactivity       * amphetamine-dextroamphetamine 30 MG per 24 hr capsule   Start taking on:  11/12/2018    ADDERALL XR    30 capsule    Take 1 capsule (30 mg) by mouth daily    Attention deficit disorder (ADD) without hyperactivity       * amphetamine-dextroamphetamine 30 MG per 24 hr capsule   Start taking on:  12/13/2018    ADDERALL XR    30 capsule    Take 1 capsule (30 mg) by mouth daily    Attention deficit disorder (ADD) without hyperactivity       * Notice:  This list has 4 medication(s) that are the same as other medications prescribed for you. Read the directions carefully, and ask your doctor or other care provider to review them with you.

## 2019-01-14 ENCOUNTER — E-VISIT (OUTPATIENT)
Dept: FAMILY MEDICINE | Facility: CLINIC | Age: 29
End: 2019-01-14

## 2019-01-14 DIAGNOSIS — F98.8 ATTENTION DEFICIT DISORDER (ADD) WITHOUT HYPERACTIVITY: Primary | ICD-10-CM

## 2019-01-14 PROCEDURE — 99444 ZZC PHYSICIAN ONLINE EVALUATION & MANAGEMENT SERVICE: CPT | Performed by: FAMILY MEDICINE

## 2019-01-14 RX ORDER — DEXTROAMPHETAMINE SACCHARATE, AMPHETAMINE ASPARTATE MONOHYDRATE, DEXTROAMPHETAMINE SULFATE AND AMPHETAMINE SULFATE 7.5; 7.5; 7.5; 7.5 MG/1; MG/1; MG/1; MG/1
30 CAPSULE, EXTENDED RELEASE ORAL DAILY
Qty: 30 CAPSULE | Refills: 0 | Status: SHIPPED | OUTPATIENT
Start: 2019-01-14 | End: 2019-04-12

## 2019-01-14 RX ORDER — DEXTROAMPHETAMINE SACCHARATE, AMPHETAMINE ASPARTATE MONOHYDRATE, DEXTROAMPHETAMINE SULFATE AND AMPHETAMINE SULFATE 7.5; 7.5; 7.5; 7.5 MG/1; MG/1; MG/1; MG/1
30 CAPSULE, EXTENDED RELEASE ORAL DAILY
Qty: 30 CAPSULE | Refills: 0 | Status: SHIPPED | OUTPATIENT
Start: 2019-03-17 | End: 2019-04-16

## 2019-01-14 RX ORDER — DEXTROAMPHETAMINE SACCHARATE, AMPHETAMINE ASPARTATE MONOHYDRATE, DEXTROAMPHETAMINE SULFATE AND AMPHETAMINE SULFATE 7.5; 7.5; 7.5; 7.5 MG/1; MG/1; MG/1; MG/1
30 CAPSULE, EXTENDED RELEASE ORAL DAILY
Qty: 30 CAPSULE | Refills: 0 | Status: SHIPPED | OUTPATIENT
Start: 2019-02-14 | End: 2019-04-12

## 2019-04-12 ENCOUNTER — OFFICE VISIT (OUTPATIENT)
Dept: FAMILY MEDICINE | Facility: CLINIC | Age: 29
End: 2019-04-12
Payer: COMMERCIAL

## 2019-04-12 VITALS
TEMPERATURE: 98.7 F | OXYGEN SATURATION: 99 % | RESPIRATION RATE: 18 BRPM | WEIGHT: 213.19 LBS | HEART RATE: 59 BPM | SYSTOLIC BLOOD PRESSURE: 102 MMHG | BODY MASS INDEX: 28.25 KG/M2 | HEIGHT: 73 IN | DIASTOLIC BLOOD PRESSURE: 67 MMHG

## 2019-04-12 DIAGNOSIS — F98.8 ATTENTION DEFICIT DISORDER (ADD) WITHOUT HYPERACTIVITY: Primary | ICD-10-CM

## 2019-04-12 PROCEDURE — 99213 OFFICE O/P EST LOW 20 MIN: CPT | Performed by: FAMILY MEDICINE

## 2019-04-12 RX ORDER — DEXTROAMPHETAMINE SACCHARATE, AMPHETAMINE ASPARTATE MONOHYDRATE, DEXTROAMPHETAMINE SULFATE AND AMPHETAMINE SULFATE 7.5; 7.5; 7.5; 7.5 MG/1; MG/1; MG/1; MG/1
30 CAPSULE, EXTENDED RELEASE ORAL DAILY
Qty: 30 CAPSULE | Refills: 0 | Status: SHIPPED | OUTPATIENT
Start: 2019-05-15 | End: 2019-06-14

## 2019-04-12 RX ORDER — DEXTROAMPHETAMINE SACCHARATE, AMPHETAMINE ASPARTATE MONOHYDRATE, DEXTROAMPHETAMINE SULFATE AND AMPHETAMINE SULFATE 7.5; 7.5; 7.5; 7.5 MG/1; MG/1; MG/1; MG/1
30 CAPSULE, EXTENDED RELEASE ORAL DAILY
Qty: 30 CAPSULE | Refills: 0 | Status: SHIPPED | OUTPATIENT
Start: 2019-06-15 | End: 2019-07-15

## 2019-04-12 RX ORDER — DEXTROAMPHETAMINE SACCHARATE, AMPHETAMINE ASPARTATE MONOHYDRATE, DEXTROAMPHETAMINE SULFATE AND AMPHETAMINE SULFATE 7.5; 7.5; 7.5; 7.5 MG/1; MG/1; MG/1; MG/1
30 CAPSULE, EXTENDED RELEASE ORAL DAILY
Qty: 30 CAPSULE | Refills: 0 | Status: SHIPPED | OUTPATIENT
Start: 2019-04-15 | End: 2019-05-15

## 2019-04-12 ASSESSMENT — MIFFLIN-ST. JEOR: SCORE: 1985.89

## 2019-04-12 ASSESSMENT — PATIENT HEALTH QUESTIONNAIRE - PHQ9: SUM OF ALL RESPONSES TO PHQ QUESTIONS 1-9: 2

## 2019-04-12 ASSESSMENT — PAIN SCALES - GENERAL: PAINLEVEL: NO PAIN (0)

## 2019-04-12 NOTE — PROGRESS NOTES
"  SUBJECTIVE:   All Barboza is a 28 year old male who presents to clinic today for the following   health issues:        Medication Followup of Adderall    Taking Medication as prescribed: yes    Side Effects:  None    Medication Helping Symptoms:  yes       PHQ-9 SCORE 11/3/2016 2/2/2017 8/25/2017   PHQ-9 Total Score - - -   PHQ-9 Total Score 13 5 11       Family history of depression        Aggravating factors include: Attention to task  Relieving factors include: Past history of use of 15 mg long-acting Adderall twice a day  Records and U MP from 2011  Activity level/function:              Daily activities:  Able to do all daily activities            Work:  not applicable  Recent family or social stressors:  none noted  Psychologist:  No    History of chemical dependency:  No  Sleep:  Good  Depression/mood issues:  No  Anxiety issues YES     Problem list and histories reviewed & adjusted, as indicated.  Additional history: as documented    Patient Active Problem List   Diagnosis     Attention deficit disorder (ADD) without hyperactivity     Anxiety     No past surgical history on file.    Social History     Tobacco Use     Smoking status: Never Smoker     Smokeless tobacco: Never Used   Substance Use Topics     Alcohol use: Yes     Alcohol/week: 0.0 oz     Comment: social      Family History   Problem Relation Age of Onset     Breast Cancer Mother      Depression Mother            ROS:  PSYCHIATRIC: NEGATIVE for changes in mood or affect  ROS otherwise negative    OBJECTIVE:                                                    /67 (BP Location: Left arm, Patient Position: Sitting, Cuff Size: Adult Large)   Pulse 59   Temp 98.7  F (37.1  C) (Oral)   Resp 18   Ht 1.854 m (6' 1\")   Wt 96.7 kg (213 lb 3 oz)   SpO2 99%   BMI 28.13 kg/m    Body mass index is 28.13 kg/m .  GENERAL: healthy, alert and no distress  MS: no gross musculoskeletal defects noted, no edema  Anxious    Diagnostic Test " Results:  none      ASSESSMENT/PLAN:                                                        ICD-10-CM    1. Attention deficit disorder (ADD) without hyperactivity F98.8 amphetamine-dextroamphetamine (ADDERALL XR) 30 MG 24 hr capsule     amphetamine-dextroamphetamine (ADDERALL XR) 30 MG 24 hr capsule     amphetamine-dextroamphetamine (ADDERALL XR) 30 MG 24 hr capsule       Stable attention deficit disorder,    discussed patient following up with a 3 month E visit or phone visit  And six-month face-to-face visit        Bj Correia MD  [facility]

## 2019-04-12 NOTE — NURSING NOTE
"Chief Complaint   Patient presents with     Medication Reconciliation     /67 (BP Location: Left arm, Patient Position: Sitting, Cuff Size: Adult Large)   Pulse 59   Temp 98.7  F (37.1  C) (Oral)   Resp 18   Ht 1.854 m (6' 1\")   Wt 96.7 kg (213 lb 3 oz)   SpO2 99%   BMI 28.13 kg/m   Estimated body mass index is 28.13 kg/m  as calculated from the following:    Height as of this encounter: 1.854 m (6' 1\").    Weight as of this encounter: 96.7 kg (213 lb 3 oz).  bp completed using cuff size: regular      Health Maintenance addressed:  NONE    n/a              "

## 2019-07-22 ENCOUNTER — E-VISIT (OUTPATIENT)
Dept: FAMILY MEDICINE | Facility: CLINIC | Age: 29
End: 2019-07-22
Payer: COMMERCIAL

## 2019-07-22 DIAGNOSIS — F98.8 ATTENTION DEFICIT DISORDER (ADD) WITHOUT HYPERACTIVITY: Primary | ICD-10-CM

## 2019-07-22 PROCEDURE — 99444 ZZC PHYSICIAN ONLINE EVALUATION & MANAGEMENT SERVICE: CPT | Performed by: FAMILY MEDICINE

## 2019-07-22 RX ORDER — DEXTROAMPHETAMINE SACCHARATE, AMPHETAMINE ASPARTATE MONOHYDRATE, DEXTROAMPHETAMINE SULFATE AND AMPHETAMINE SULFATE 7.5; 7.5; 7.5; 7.5 MG/1; MG/1; MG/1; MG/1
30 CAPSULE, EXTENDED RELEASE ORAL DAILY
Qty: 30 CAPSULE | Refills: 0 | Status: SHIPPED | OUTPATIENT
Start: 2019-09-22 | End: 2019-10-22

## 2019-07-22 RX ORDER — DEXTROAMPHETAMINE SACCHARATE, AMPHETAMINE ASPARTATE MONOHYDRATE, DEXTROAMPHETAMINE SULFATE AND AMPHETAMINE SULFATE 7.5; 7.5; 7.5; 7.5 MG/1; MG/1; MG/1; MG/1
30 CAPSULE, EXTENDED RELEASE ORAL DAILY
Qty: 30 CAPSULE | Refills: 0 | Status: SHIPPED | OUTPATIENT
Start: 2019-08-22 | End: 2019-09-21

## 2019-07-22 RX ORDER — DEXTROAMPHETAMINE SACCHARATE, AMPHETAMINE ASPARTATE MONOHYDRATE, DEXTROAMPHETAMINE SULFATE AND AMPHETAMINE SULFATE 7.5; 7.5; 7.5; 7.5 MG/1; MG/1; MG/1; MG/1
30 CAPSULE, EXTENDED RELEASE ORAL DAILY
Qty: 30 CAPSULE | Refills: 0 | Status: SHIPPED | OUTPATIENT
Start: 2019-07-22 | End: 2019-08-21

## 2019-11-26 ENCOUNTER — OFFICE VISIT (OUTPATIENT)
Dept: FAMILY MEDICINE | Facility: CLINIC | Age: 29
End: 2019-11-26
Payer: COMMERCIAL

## 2019-11-26 VITALS
BODY MASS INDEX: 29.59 KG/M2 | SYSTOLIC BLOOD PRESSURE: 98 MMHG | OXYGEN SATURATION: 100 % | HEART RATE: 56 BPM | RESPIRATION RATE: 14 BRPM | DIASTOLIC BLOOD PRESSURE: 60 MMHG | HEIGHT: 74 IN | WEIGHT: 230.6 LBS

## 2019-11-26 DIAGNOSIS — F98.8 ATTENTION DEFICIT DISORDER (ADD) WITHOUT HYPERACTIVITY: Primary | ICD-10-CM

## 2019-11-26 PROCEDURE — 99213 OFFICE O/P EST LOW 20 MIN: CPT | Performed by: FAMILY MEDICINE

## 2019-11-26 RX ORDER — DEXTROAMPHETAMINE SACCHARATE, AMPHETAMINE ASPARTATE MONOHYDRATE, DEXTROAMPHETAMINE SULFATE AND AMPHETAMINE SULFATE 7.5; 7.5; 7.5; 7.5 MG/1; MG/1; MG/1; MG/1
30 CAPSULE, EXTENDED RELEASE ORAL DAILY
Qty: 30 CAPSULE | Refills: 0 | Status: SHIPPED | OUTPATIENT
Start: 2020-01-27 | End: 2020-02-26

## 2019-11-26 RX ORDER — DEXTROAMPHETAMINE SACCHARATE, AMPHETAMINE ASPARTATE MONOHYDRATE, DEXTROAMPHETAMINE SULFATE AND AMPHETAMINE SULFATE 7.5; 7.5; 7.5; 7.5 MG/1; MG/1; MG/1; MG/1
30 CAPSULE, EXTENDED RELEASE ORAL DAILY
Qty: 30 CAPSULE | Refills: 0 | Status: SHIPPED | OUTPATIENT
Start: 2019-12-27 | End: 2020-01-26

## 2019-11-26 RX ORDER — DEXTROAMPHETAMINE SACCHARATE, AMPHETAMINE ASPARTATE MONOHYDRATE, DEXTROAMPHETAMINE SULFATE AND AMPHETAMINE SULFATE 7.5; 7.5; 7.5; 7.5 MG/1; MG/1; MG/1; MG/1
30 CAPSULE, EXTENDED RELEASE ORAL DAILY
Qty: 30 CAPSULE | Refills: 0 | Status: SHIPPED | OUTPATIENT
Start: 2019-11-26 | End: 2020-06-09

## 2019-11-26 ASSESSMENT — PAIN SCALES - GENERAL: PAINLEVEL: NO PAIN (0)

## 2019-11-26 ASSESSMENT — MIFFLIN-ST. JEOR: SCORE: 2075.74

## 2019-11-26 NOTE — PROGRESS NOTES
"  All Barboza is a 29 year old male who presents to clinic today for the following health issues:    HPI   Medication Followup of adderall    Taking Medication as prescribed: yes    Side Effects:  None    Medication Helping Symptoms:  yes       PHQ-9 SCORE 2/2/2017 8/25/2017 4/12/2019   PHQ-9 Total Score - - -   PHQ-9 Total Score 5 11 2       Family history of depression        Aggravating factors include: Attention to task  Relieving factors include: Past history of use of 15 mg long-acting Adderall twice a day  Records and U MP from 2011  Activity level/function:              Daily activities:  Able to do all daily activities            Work:  not applicable  Recent family or social stressors:  none noted  Psychologist:  No    History of chemical dependency:  No  Sleep:  Good  Depression/mood issues:  No  Anxiety issues YES     Problem list and histories reviewed & adjusted, as indicated.  Additional history: as documented    Patient Active Problem List   Diagnosis     Attention deficit disorder (ADD) without hyperactivity     Anxiety     No past surgical history on file.    Social History     Tobacco Use     Smoking status: Never Smoker     Smokeless tobacco: Never Used   Substance Use Topics     Alcohol use: Yes     Alcohol/week: 0.0 standard drinks     Comment: social      Family History   Problem Relation Age of Onset     Breast Cancer Mother      Depression Mother            ROS:  PSYCHIATRIC: NEGATIVE for changes in mood or affect  ROS otherwise negative    OBJECTIVE:                                                    BP 98/60 (BP Location: Left arm, Patient Position: Sitting, Cuff Size: Adult Large)   Pulse 56   Resp 14   Ht 1.88 m (6' 2\")   Wt 104.6 kg (230 lb 9.6 oz)   SpO2 100%   BMI 29.61 kg/m    Body mass index is 29.61 kg/m .  GENERAL: healthy, alert and no distress  MS: no gross musculoskeletal defects noted, no edema  Anxious    Diagnostic Test Results:  none      ASSESSMENT/PLAN:          "                                               ICD-10-CM    1. Attention deficit disorder (ADD) without hyperactivity F98.8 amphetamine-dextroamphetamine (ADDERALL XR) 30 MG 24 hr capsule     amphetamine-dextroamphetamine (ADDERALL XR) 30 MG 24 hr capsule     amphetamine-dextroamphetamine (ADDERALL XR) 30 MG 24 hr capsule       Stable attention deficit disorder,    discussed patient following up with a 3 month E visit or phone visit  And six-month face-to-face visit        Bj Correia MD  [facility]

## 2020-03-01 ENCOUNTER — E-VISIT (OUTPATIENT)
Dept: FAMILY MEDICINE | Facility: CLINIC | Age: 30
End: 2020-03-01
Payer: COMMERCIAL

## 2020-03-01 ENCOUNTER — HEALTH MAINTENANCE LETTER (OUTPATIENT)
Age: 30
End: 2020-03-01

## 2020-03-01 DIAGNOSIS — F98.8 ATTENTION DEFICIT DISORDER (ADD) WITHOUT HYPERACTIVITY: Primary | ICD-10-CM

## 2020-03-01 PROCEDURE — 99421 OL DIG E/M SVC 5-10 MIN: CPT | Performed by: FAMILY MEDICINE

## 2020-03-02 DIAGNOSIS — F98.8 ATTENTION DEFICIT DISORDER (ADD) WITHOUT HYPERACTIVITY: ICD-10-CM

## 2020-03-02 RX ORDER — DEXTROAMPHETAMINE SACCHARATE, AMPHETAMINE ASPARTATE MONOHYDRATE, DEXTROAMPHETAMINE SULFATE AND AMPHETAMINE SULFATE 7.5; 7.5; 7.5; 7.5 MG/1; MG/1; MG/1; MG/1
30 CAPSULE, EXTENDED RELEASE ORAL DAILY
Qty: 30 CAPSULE | Refills: 0 | Status: SHIPPED | OUTPATIENT
Start: 2020-04-02 | End: 2020-03-02

## 2020-03-02 RX ORDER — DEXTROAMPHETAMINE SACCHARATE, AMPHETAMINE ASPARTATE MONOHYDRATE, DEXTROAMPHETAMINE SULFATE AND AMPHETAMINE SULFATE 7.5; 7.5; 7.5; 7.5 MG/1; MG/1; MG/1; MG/1
30 CAPSULE, EXTENDED RELEASE ORAL DAILY
Qty: 30 CAPSULE | Refills: 0 | Status: SHIPPED | OUTPATIENT
Start: 2020-04-02 | End: 2020-05-02

## 2020-03-02 RX ORDER — DEXTROAMPHETAMINE SACCHARATE, AMPHETAMINE ASPARTATE MONOHYDRATE, DEXTROAMPHETAMINE SULFATE AND AMPHETAMINE SULFATE 7.5; 7.5; 7.5; 7.5 MG/1; MG/1; MG/1; MG/1
30 CAPSULE, EXTENDED RELEASE ORAL DAILY
Qty: 30 CAPSULE | Refills: 0 | Status: SHIPPED | OUTPATIENT
Start: 2020-03-02 | End: 2020-04-01

## 2020-03-02 RX ORDER — DEXTROAMPHETAMINE SACCHARATE, AMPHETAMINE ASPARTATE MONOHYDRATE, DEXTROAMPHETAMINE SULFATE AND AMPHETAMINE SULFATE 7.5; 7.5; 7.5; 7.5 MG/1; MG/1; MG/1; MG/1
30 CAPSULE, EXTENDED RELEASE ORAL DAILY
Qty: 30 CAPSULE | Refills: 0 | Status: SHIPPED | OUTPATIENT
Start: 2020-05-03 | End: 2020-06-02

## 2020-03-02 NOTE — TELEPHONE ENCOUNTER
Reason for Call:  Medication or medication refill:    Do you use a [facility]?  Name of the pharmacy and phone number for the current request:      [id number] - [place], [place] - [address]  AT [address] [phone]    Name of the medication requested:   amphetamine-dextroamphetamine (ADDERALL XR) 30 MG 24 hr capsule    Other request: Pharmacy called and reports that one of the RX's sent today for the medication above was corrupted and they need a replacement sent dated the 4/30/2020.     Can we leave a detailed message on this number? Not Applicable    Phone number patient can be reached at: Other phone number:  N/A    Best Time: Any    Call taken on 3/2/2020 at 8:33 AM by Smiley Auguste

## 2020-03-02 NOTE — TELEPHONE ENCOUNTER
"[facility] pharmacy called.  Had issue with computers and the Adderall Rx dated 3/2/2020 was \"corrupted\".  They had to fill the Rx dated to start 4/2 for March.    Need  replacement sent for April  Order T'd up with start date 4/2/2020    Thanks,  Radha Medel RN        "

## 2020-06-04 ENCOUNTER — MYC MEDICAL ADVICE (OUTPATIENT)
Dept: FAMILY MEDICINE | Facility: CLINIC | Age: 30
End: 2020-06-04

## 2020-06-09 ENCOUNTER — VIRTUAL VISIT (OUTPATIENT)
Dept: FAMILY MEDICINE | Facility: CLINIC | Age: 30
End: 2020-06-09
Payer: COMMERCIAL

## 2020-06-09 DIAGNOSIS — F98.8 ATTENTION DEFICIT DISORDER (ADD) WITHOUT HYPERACTIVITY: Primary | ICD-10-CM

## 2020-06-09 PROBLEM — F41.9 ANXIETY: Status: RESOLVED | Noted: 2017-12-11 | Resolved: 2020-06-09

## 2020-06-09 PROCEDURE — 96127 BRIEF EMOTIONAL/BEHAV ASSMT: CPT | Performed by: FAMILY MEDICINE

## 2020-06-09 PROCEDURE — 99213 OFFICE O/P EST LOW 20 MIN: CPT | Mod: GT | Performed by: FAMILY MEDICINE

## 2020-06-09 RX ORDER — DEXTROAMPHETAMINE SACCHARATE, AMPHETAMINE ASPARTATE MONOHYDRATE, DEXTROAMPHETAMINE SULFATE AND AMPHETAMINE SULFATE 7.5; 7.5; 7.5; 7.5 MG/1; MG/1; MG/1; MG/1
30 CAPSULE, EXTENDED RELEASE ORAL DAILY
Qty: 30 CAPSULE | Refills: 0 | Status: SHIPPED | OUTPATIENT
Start: 2020-06-09 | End: 2020-07-09

## 2020-06-09 RX ORDER — DEXTROAMPHETAMINE SACCHARATE, AMPHETAMINE ASPARTATE MONOHYDRATE, DEXTROAMPHETAMINE SULFATE AND AMPHETAMINE SULFATE 7.5; 7.5; 7.5; 7.5 MG/1; MG/1; MG/1; MG/1
30 CAPSULE, EXTENDED RELEASE ORAL DAILY
Qty: 30 CAPSULE | Refills: 0 | Status: SHIPPED | OUTPATIENT
Start: 2020-08-10 | End: 2020-09-09

## 2020-06-09 RX ORDER — DEXTROAMPHETAMINE SACCHARATE, AMPHETAMINE ASPARTATE MONOHYDRATE, DEXTROAMPHETAMINE SULFATE AND AMPHETAMINE SULFATE 7.5; 7.5; 7.5; 7.5 MG/1; MG/1; MG/1; MG/1
30 CAPSULE, EXTENDED RELEASE ORAL DAILY
Qty: 30 CAPSULE | Refills: 0 | Status: SHIPPED | OUTPATIENT
Start: 2020-07-10 | End: 2020-08-09

## 2020-06-09 ASSESSMENT — PATIENT HEALTH QUESTIONNAIRE - PHQ9: SUM OF ALL RESPONSES TO PHQ QUESTIONS 1-9: 0

## 2020-06-09 NOTE — PROGRESS NOTES
"All Barboza is a 29 year old male who is being evaluated via a billable video visit.      The patient has been notified of following:     \"This video visit will be conducted via a call between you and your physician/provider. We have found that certain health care needs can be provided without the need for an in-person physical exam.  This service lets us provide the care you need with a video conversation.  If a prescription is necessary we can send it directly to your pharmacy.  If lab work is needed we can place an order for that and you can then stop by our lab to have the test done at a later time.    Video visits are billed at different rates depending on your insurance coverage.  Please reach out to your insurance provider with any questions.    If during the course of the call the physician/provider feels a video visit is not appropriate, you will not be charged for this service.\"    Patient has given verbal consent for Video visit? Yes    How would you like to obtain your AVS? SarahPoultney    Patient would like the video invitation sent by: Text to cell phone: [phone]    Will anyone else be joining your video visit? No    Subjective     All Barboza is a 29 year old male who presents today via video visit for the following health issues:    HPI  Medication Followup of Adderall    Taking Medication as prescribed: yes    Side Effects:  None    Medication Helping Symptoms:  yes      Aggravating factors include: Attention to task  Relieving factors include: Past history of use of 15 mg long-acting Adderall twice a day  Records and U MP from 2011  Activity level/function:              Daily activities:  Able to do all daily activities            Work:  not applicable  Recent family or social stressors:  none noted  Psychologist:  No     History of chemical dependency:  No  Sleep:  Good  Depression/mood issues:  No  Anxiety issues YES    Video Start Time: 10:26 AM        Patient Active Problem List " "  Diagnosis     Attention deficit disorder (ADD) without hyperactivity     Anxiety     No past surgical history on file.    Social History     Tobacco Use     Smoking status: Never Smoker     Smokeless tobacco: Never Used   Substance Use Topics     Alcohol use: Yes     Alcohol/week: 0.0 standard drinks     Comment: social      Family History   Problem Relation Age of Onset     Breast Cancer Mother      Depression Mother          Current Outpatient Medications   Medication Sig Dispense Refill     amphetamine-dextroamphetamine (ADDERALL XR) 30 MG 24 hr capsule Take 1 capsule (30 mg) by mouth daily 30 capsule 0     Not on File    Reviewed and updated as needed this visit by Provider         Review of Systems   NEURO: NEGATIVE for weakness, dizziness or paresthesias  PSYCHIATRIC: NEGATIVE for changes in mood or affect      Objective    There were no vitals taken for this visit.  Estimated body mass index is 29.61 kg/m  as calculated from the following:    Height as of 11/26/19: 1.88 m (6' 2\").    Weight as of 11/26/19: 104.6 kg (230 lb 9.6 oz).  Physical Exam     GENERAL: Healthy, alert and no distress  EYES: Eyes grossly normal to inspection.  No discharge or erythema, or obvious scleral/conjunctival abnormalities.  RESP: No audible wheeze, cough, or visible cyanosis.  No visible retractions or increased work of breathing.    SKIN: Visible skin clear. No significant rash, abnormal pigmentation or lesions.  NEURO: Cranial nerves grossly intact.  Mentation and speech appropriate for age.  PSYCH: Mentation appears normal, affect normal/bright, judgement and insight intact, normal speech and appearance well-groomed.          Assessment & Plan       ICD-10-CM    1. Attention deficit disorder (ADD) without hyperactivity  F98.8 EMOTIONAL / BEHAVIORAL ASSESSMENT     amphetamine-dextroamphetamine (ADDERALL XR) 30 MG 24 hr capsule     amphetamine-dextroamphetamine (ADDERALL XR) 30 MG 24 hr capsule     " amphetamine-dextroamphetamine (ADDERALL XR) 30 MG 24 hr capsule       SELF MONITORING:       - Please check blood pressure readings occasionally    No follow-ups on file.    Bj Correia MD  [facility]      Video-Visit Details    Type of service:  Video Visit    Video End Time:10:32 AM    Originating Location (pt. Location): Home    Distant Location (provider location):  [facility]     Platform used for Video Visit: Jet    No follow-ups on file.       Bj Correia MD

## 2020-09-09 ENCOUNTER — E-VISIT (OUTPATIENT)
Dept: FAMILY MEDICINE | Facility: CLINIC | Age: 30
End: 2020-09-09
Payer: COMMERCIAL

## 2020-09-09 DIAGNOSIS — F98.8 ATTENTION DEFICIT DISORDER (ADD) WITHOUT HYPERACTIVITY: Primary | ICD-10-CM

## 2020-09-09 PROCEDURE — 99421 OL DIG E/M SVC 5-10 MIN: CPT | Performed by: FAMILY MEDICINE

## 2020-09-10 RX ORDER — DEXTROAMPHETAMINE SACCHARATE, AMPHETAMINE ASPARTATE MONOHYDRATE, DEXTROAMPHETAMINE SULFATE AND AMPHETAMINE SULFATE 7.5; 7.5; 7.5; 7.5 MG/1; MG/1; MG/1; MG/1
30 CAPSULE, EXTENDED RELEASE ORAL DAILY
Qty: 30 CAPSULE | Refills: 0 | Status: SHIPPED | OUTPATIENT
Start: 2020-09-10 | End: 2020-10-10

## 2020-09-10 RX ORDER — DEXTROAMPHETAMINE SACCHARATE, AMPHETAMINE ASPARTATE MONOHYDRATE, DEXTROAMPHETAMINE SULFATE AND AMPHETAMINE SULFATE 7.5; 7.5; 7.5; 7.5 MG/1; MG/1; MG/1; MG/1
30 CAPSULE, EXTENDED RELEASE ORAL DAILY
Qty: 30 CAPSULE | Refills: 0 | Status: SHIPPED | OUTPATIENT
Start: 2020-11-11 | End: 2020-12-11

## 2020-09-10 RX ORDER — DEXTROAMPHETAMINE SACCHARATE, AMPHETAMINE ASPARTATE MONOHYDRATE, DEXTROAMPHETAMINE SULFATE AND AMPHETAMINE SULFATE 7.5; 7.5; 7.5; 7.5 MG/1; MG/1; MG/1; MG/1
30 CAPSULE, EXTENDED RELEASE ORAL DAILY
Qty: 30 CAPSULE | Refills: 0 | Status: SHIPPED | OUTPATIENT
Start: 2020-10-11 | End: 2020-11-10

## 2020-09-10 NOTE — PATIENT INSTRUCTIONS
Thank you for choosing us for your care. I have placed an order for a prescription so that you can start treatment. View your full visit summary for details by clicking on the link below. Your pharmacist will able to address any questions you may have about the medication.     If you're not feeling better within 5-7 days, please schedule an appointment.  You can schedule an appointment right here in [place], or call [phone]  If the visit is for the same symptoms as your e-visit, we'll refund the cost of your e-visit if seen within seven days.

## 2020-12-13 ENCOUNTER — HEALTH MAINTENANCE LETTER (OUTPATIENT)
Age: 30
End: 2020-12-13

## 2021-02-01 ENCOUNTER — OFFICE VISIT (OUTPATIENT)
Dept: FAMILY MEDICINE | Facility: CLINIC | Age: 31
End: 2021-02-01
Payer: COMMERCIAL

## 2021-02-01 VITALS
BODY MASS INDEX: 29.9 KG/M2 | HEART RATE: 64 BPM | DIASTOLIC BLOOD PRESSURE: 58 MMHG | WEIGHT: 233 LBS | HEIGHT: 74 IN | SYSTOLIC BLOOD PRESSURE: 111 MMHG | TEMPERATURE: 98.4 F | OXYGEN SATURATION: 100 %

## 2021-02-01 DIAGNOSIS — F98.8 ATTENTION DEFICIT DISORDER (ADD) WITHOUT HYPERACTIVITY: ICD-10-CM

## 2021-02-01 DIAGNOSIS — F41.9 ANXIETY: Primary | ICD-10-CM

## 2021-02-01 PROCEDURE — 99214 OFFICE O/P EST MOD 30 MIN: CPT | Performed by: FAMILY MEDICINE

## 2021-02-01 RX ORDER — DEXTROAMPHETAMINE SACCHARATE, AMPHETAMINE ASPARTATE MONOHYDRATE, DEXTROAMPHETAMINE SULFATE AND AMPHETAMINE SULFATE 7.5; 7.5; 7.5; 7.5 MG/1; MG/1; MG/1; MG/1
30 CAPSULE, EXTENDED RELEASE ORAL
COMMUNITY
Start: 2018-07-10 | End: 2021-08-30

## 2021-02-01 RX ORDER — DEXTROAMPHETAMINE SACCHARATE, AMPHETAMINE ASPARTATE MONOHYDRATE, DEXTROAMPHETAMINE SULFATE AND AMPHETAMINE SULFATE 7.5; 7.5; 7.5; 7.5 MG/1; MG/1; MG/1; MG/1
30 CAPSULE, EXTENDED RELEASE ORAL DAILY
Qty: 30 CAPSULE | Refills: 0 | Status: SHIPPED | OUTPATIENT
Start: 2021-03-04 | End: 2021-04-03

## 2021-02-01 RX ORDER — DEXTROAMPHETAMINE SACCHARATE, AMPHETAMINE ASPARTATE MONOHYDRATE, DEXTROAMPHETAMINE SULFATE AND AMPHETAMINE SULFATE 7.5; 7.5; 7.5; 7.5 MG/1; MG/1; MG/1; MG/1
30 CAPSULE, EXTENDED RELEASE ORAL DAILY
Qty: 30 CAPSULE | Refills: 0 | Status: SHIPPED | OUTPATIENT
Start: 2021-04-04 | End: 2021-05-04

## 2021-02-01 RX ORDER — DEXTROAMPHETAMINE SACCHARATE, AMPHETAMINE ASPARTATE MONOHYDRATE, DEXTROAMPHETAMINE SULFATE AND AMPHETAMINE SULFATE 7.5; 7.5; 7.5; 7.5 MG/1; MG/1; MG/1; MG/1
30 CAPSULE, EXTENDED RELEASE ORAL DAILY
Qty: 30 CAPSULE | Refills: 0 | Status: SHIPPED | OUTPATIENT
Start: 2021-02-01 | End: 2021-03-03

## 2021-02-01 RX ORDER — ESCITALOPRAM OXALATE 10 MG/1
10 TABLET ORAL DAILY
Qty: 90 TABLET | Refills: 1 | Status: SHIPPED | OUTPATIENT
Start: 2021-02-01 | End: 2021-08-30

## 2021-02-01 ASSESSMENT — MIFFLIN-ST. JEOR: SCORE: 2081.63

## 2021-02-01 ASSESSMENT — PATIENT HEALTH QUESTIONNAIRE - PHQ9: SUM OF ALL RESPONSES TO PHQ QUESTIONS 1-9: 10

## 2021-02-01 NOTE — PROGRESS NOTES
"  Assessment & Plan     Anxiety  Recent worsening anxiety due to life issues  Anxiety and methods to control it and to deal with symptoms discussed. Patient, upon questioning, does not appear to be at high risk for suicide. medication discussed and recommendations made.  Patient is adviced to call if proscribed treatment not causing improvement or if symptoms worsen at any time or any new symptoms or problems develop.    - escitalopram (LEXAPRO) 10 MG tablet; Take 1 tablet (10 mg) by mouth daily    Attention deficit disorder (ADD) without hyperactivity  stbale  - amphetamine-dextroamphetamine (ADDERALL XR) 30 MG 24 hr capsule; Take 1 capsule (30 mg) by mouth daily  - amphetamine-dextroamphetamine (ADDERALL XR) 30 MG 24 hr capsule; Take 1 capsule (30 mg) by mouth daily  - amphetamine-dextroamphetamine (ADDERALL XR) 30 MG 24 hr capsule; Take 1 capsule (30 mg) by mouth daily                       BMI:   Estimated body mass index is 29.92 kg/m  as calculated from the following:    Height as of this encounter: 1.88 m (6' 2\").    Weight as of this encounter: 105.7 kg (233 lb).             No follow-ups on file.    Bj Correia MD  [facility]     All is a 30 year old who presents to clinic today for the following health issues     HPI       Medication Followup of Adderall    Taking Medication as prescribed: yes    Side Effects:  None    Medication Helping Symptoms:  yes     Anxiety is worse      Review of Systems   Constitutional, HEENT, cardiovascular, pulmonary, GI, , musculoskeletal, neuro, skin, endocrine and psych systems are negative, except as otherwise noted.      Objective    /58 (BP Location: Left arm, Cuff Size: Adult Large)   Pulse 64   Temp 98.4  F (36.9  C) (Tympanic)   Ht 1.88 m (6' 2\")   Wt 105.7 kg (233 lb)   SpO2 100%   BMI 29.92 kg/m    Body mass index is 29.92 kg/m .  Physical Exam   GENERAL: healthy, alert and no distress  MS: no gross " musculoskeletal defects noted, no edema  PSYCH: mentation appears normal, affect normal/bright

## 2021-02-01 NOTE — NURSING NOTE
"Chief Complaint   Patient presents with     Recheck Medication     ADHD     initial /58 (BP Location: Left arm, Cuff Size: Adult Large)   Pulse 64   Temp 98.4  F (36.9  C) (Tympanic)   Ht 1.88 m (6' 2\")   Wt 105.7 kg (233 lb)   SpO2 100%   BMI 29.92 kg/m   Estimated body mass index is 29.92 kg/m  as calculated from the following:    Height as of this encounter: 1.88 m (6' 2\").    Weight as of this encounter: 105.7 kg (233 lb).  BP completed using cuff size: large.  L  arm      Health Maintenance that is potentially due pending provider review:  NONE    n/a    Alexis Bcak ma  "

## 2021-04-17 ENCOUNTER — HEALTH MAINTENANCE LETTER (OUTPATIENT)
Age: 31
End: 2021-04-17

## 2021-05-14 ENCOUNTER — E-VISIT (OUTPATIENT)
Dept: FAMILY MEDICINE | Facility: CLINIC | Age: 31
End: 2021-05-14
Payer: COMMERCIAL

## 2021-05-14 DIAGNOSIS — F98.8 ATTENTION DEFICIT DISORDER (ADD) WITHOUT HYPERACTIVITY: Primary | ICD-10-CM

## 2021-05-14 PROCEDURE — 99421 OL DIG E/M SVC 5-10 MIN: CPT | Performed by: FAMILY MEDICINE

## 2021-05-17 RX ORDER — DEXTROAMPHETAMINE SACCHARATE, AMPHETAMINE ASPARTATE MONOHYDRATE, DEXTROAMPHETAMINE SULFATE AND AMPHETAMINE SULFATE 7.5; 7.5; 7.5; 7.5 MG/1; MG/1; MG/1; MG/1
30 CAPSULE, EXTENDED RELEASE ORAL DAILY
Qty: 30 CAPSULE | Refills: 0 | Status: SHIPPED | OUTPATIENT
Start: 2021-05-17 | End: 2021-06-16

## 2021-05-17 RX ORDER — DEXTROAMPHETAMINE SACCHARATE, AMPHETAMINE ASPARTATE MONOHYDRATE, DEXTROAMPHETAMINE SULFATE AND AMPHETAMINE SULFATE 7.5; 7.5; 7.5; 7.5 MG/1; MG/1; MG/1; MG/1
30 CAPSULE, EXTENDED RELEASE ORAL DAILY
Qty: 30 CAPSULE | Refills: 0 | Status: SHIPPED | OUTPATIENT
Start: 2021-06-17 | End: 2021-07-17

## 2021-05-17 RX ORDER — DEXTROAMPHETAMINE SACCHARATE, AMPHETAMINE ASPARTATE MONOHYDRATE, DEXTROAMPHETAMINE SULFATE AND AMPHETAMINE SULFATE 7.5; 7.5; 7.5; 7.5 MG/1; MG/1; MG/1; MG/1
30 CAPSULE, EXTENDED RELEASE ORAL DAILY
Qty: 30 CAPSULE | Refills: 0 | Status: SHIPPED | OUTPATIENT
Start: 2021-07-18 | End: 2021-08-17

## 2021-05-17 NOTE — TELEPHONE ENCOUNTER
Reviewed [place] prescription drug registry. No concerning patterns of Rx use in this state in the past year that I can find      Provider E-Visit time total (minutes): 7

## 2021-08-27 ENCOUNTER — E-VISIT (OUTPATIENT)
Dept: FAMILY MEDICINE | Facility: CLINIC | Age: 31
End: 2021-08-27
Payer: COMMERCIAL

## 2021-08-27 DIAGNOSIS — F98.8 ATTENTION DEFICIT DISORDER (ADD) WITHOUT HYPERACTIVITY: Primary | ICD-10-CM

## 2021-08-27 PROCEDURE — 99421 OL DIG E/M SVC 5-10 MIN: CPT | Performed by: FAMILY MEDICINE

## 2021-08-30 RX ORDER — DEXTROAMPHETAMINE SACCHARATE, AMPHETAMINE ASPARTATE MONOHYDRATE, DEXTROAMPHETAMINE SULFATE AND AMPHETAMINE SULFATE 7.5; 7.5; 7.5; 7.5 MG/1; MG/1; MG/1; MG/1
30 CAPSULE, EXTENDED RELEASE ORAL DAILY
Qty: 30 CAPSULE | Refills: 0 | Status: SHIPPED | OUTPATIENT
Start: 2021-08-30 | End: 2021-09-29

## 2021-08-30 RX ORDER — DEXTROAMPHETAMINE SACCHARATE, AMPHETAMINE ASPARTATE MONOHYDRATE, DEXTROAMPHETAMINE SULFATE AND AMPHETAMINE SULFATE 7.5; 7.5; 7.5; 7.5 MG/1; MG/1; MG/1; MG/1
30 CAPSULE, EXTENDED RELEASE ORAL DAILY
Qty: 30 CAPSULE | Refills: 0 | Status: SHIPPED | OUTPATIENT
Start: 2021-10-31 | End: 2021-11-30

## 2021-08-30 RX ORDER — DEXTROAMPHETAMINE SACCHARATE, AMPHETAMINE ASPARTATE MONOHYDRATE, DEXTROAMPHETAMINE SULFATE AND AMPHETAMINE SULFATE 7.5; 7.5; 7.5; 7.5 MG/1; MG/1; MG/1; MG/1
30 CAPSULE, EXTENDED RELEASE ORAL DAILY
Qty: 30 CAPSULE | Refills: 0 | Status: SHIPPED | OUTPATIENT
Start: 2021-09-30 | End: 2021-10-30

## 2021-09-26 ENCOUNTER — HEALTH MAINTENANCE LETTER (OUTPATIENT)
Age: 31
End: 2021-09-26

## 2022-01-25 ENCOUNTER — VIRTUAL VISIT (OUTPATIENT)
Dept: FAMILY MEDICINE | Facility: CLINIC | Age: 32
End: 2022-01-25
Payer: COMMERCIAL

## 2022-01-25 DIAGNOSIS — F41.9 ANXIETY: ICD-10-CM

## 2022-01-25 DIAGNOSIS — F98.8 ATTENTION DEFICIT DISORDER (ADD) WITHOUT HYPERACTIVITY: Primary | ICD-10-CM

## 2022-01-25 DIAGNOSIS — R61 GENERALIZED HYPERHIDROSIS: ICD-10-CM

## 2022-01-25 PROCEDURE — 99214 OFFICE O/P EST MOD 30 MIN: CPT | Mod: 95 | Performed by: FAMILY MEDICINE

## 2022-01-25 RX ORDER — DEXTROAMPHETAMINE SACCHARATE, AMPHETAMINE ASPARTATE MONOHYDRATE, DEXTROAMPHETAMINE SULFATE AND AMPHETAMINE SULFATE 7.5; 7.5; 7.5; 7.5 MG/1; MG/1; MG/1; MG/1
30 CAPSULE, EXTENDED RELEASE ORAL DAILY
Qty: 30 CAPSULE | Refills: 0 | Status: SHIPPED | OUTPATIENT
Start: 2022-03-28 | End: 2022-04-27

## 2022-01-25 RX ORDER — DEXTROAMPHETAMINE SACCHARATE, AMPHETAMINE ASPARTATE MONOHYDRATE, DEXTROAMPHETAMINE SULFATE AND AMPHETAMINE SULFATE 7.5; 7.5; 7.5; 7.5 MG/1; MG/1; MG/1; MG/1
30 CAPSULE, EXTENDED RELEASE ORAL DAILY
Qty: 30 CAPSULE | Refills: 0 | Status: SHIPPED | OUTPATIENT
Start: 2022-01-25 | End: 2022-02-24

## 2022-01-25 RX ORDER — BUSPIRONE HYDROCHLORIDE 5 MG/1
5 TABLET ORAL 2 TIMES DAILY
Qty: 60 TABLET | Refills: 3 | Status: SHIPPED | OUTPATIENT
Start: 2022-01-25 | End: 2022-02-09

## 2022-01-25 RX ORDER — DEXTROAMPHETAMINE SULFATE, DEXTROAMPHETAMINE SACCHARATE, AMPHETAMINE SULFATE AND AMPHETAMINE ASPARTATE 7.5; 7.5; 7.5; 7.5 MG/1; MG/1; MG/1; MG/1
CAPSULE, EXTENDED RELEASE ORAL
COMMUNITY
Start: 2021-12-28 | End: 2022-08-10

## 2022-01-25 RX ORDER — DEXTROAMPHETAMINE SACCHARATE, AMPHETAMINE ASPARTATE MONOHYDRATE, DEXTROAMPHETAMINE SULFATE AND AMPHETAMINE SULFATE 7.5; 7.5; 7.5; 7.5 MG/1; MG/1; MG/1; MG/1
30 CAPSULE, EXTENDED RELEASE ORAL DAILY
Qty: 30 CAPSULE | Refills: 0 | Status: SHIPPED | OUTPATIENT
Start: 2022-02-25 | End: 2022-03-27

## 2022-01-25 ASSESSMENT — PATIENT HEALTH QUESTIONNAIRE - PHQ9
SUM OF ALL RESPONSES TO PHQ QUESTIONS 1-9: 5
SUM OF ALL RESPONSES TO PHQ QUESTIONS 1-9: 5
10. IF YOU CHECKED OFF ANY PROBLEMS, HOW DIFFICULT HAVE THESE PROBLEMS MADE IT FOR YOU TO DO YOUR WORK, TAKE CARE OF THINGS AT HOME, OR GET ALONG WITH OTHER PEOPLE: SOMEWHAT DIFFICULT

## 2022-01-25 NOTE — PROGRESS NOTES
All is a 31 year old who is being evaluated via a billable video visit.      How would you like to obtain your AVS? MyChart  If the video visit is dropped, the invitation should be resent by: Text to cell phone: [phone]  Will anyone else be joining your video visit? No      Video Start Time: 4:54 PM    Assessment & Plan     Attention deficit disorder (ADD) without hyperactivity  stable  - amphetamine-dextroamphetamine (ADDERALL XR) 30 MG 24 hr capsule; Take 1 capsule (30 mg) by mouth daily  - amphetamine-dextroamphetamine (ADDERALL XR) 30 MG 24 hr capsule; Take 1 capsule (30 mg) by mouth daily  - amphetamine-dextroamphetamine (ADDERALL XR) 30 MG 24 hr capsule; Take 1 capsule (30 mg) by mouth daily    Anxiety  Worsening    Trail of a new med  basiclly no good results with selective serotonin reuptake inhibitor / side effects    - Adult Mental Health  Referral; Future  - busPIRone (BUSPAR) 5 MG tablet; Take 1 tablet (5 mg) by mouth 2 times daily    Generalized hyperhidrosis  stable  - aluminum chloride (DRYSOL) 20 % external solution; Apply topically At Bedtime    No follow-ups on file.    Bj Correia MD  [facility]   All is a 31 year old who presents for the following health issues    HPI     Answers for HPI/ROS submitted by the patient on 1/25/2022  If you checked off any problems, how difficult have these problems made it for you to do your work, take care of things at home, or get along with other people?: Somewhat difficult  PHQ9 TOTAL SCORE: 5        ADHD Follow-Up    Date of last ADHD office visit: 08/27/2021 -E-Visit   Status since last visit: Stable  Taking controlled (daily) medications as prescribed: Yes                       Parent/Patient Concerns with Medications: None    Medication Benefits:   Controlled symptoms: Hyperactivity - motor restlessness, Attention span and Distractability    Medication side effects:  Side effects noted:  none  Denies: appetite suppression, weight loss and insomnia    + worsening depression and anxiety      Last PHQ-9 1/25/2022   1.  Little interest or pleasure in doing things 1   2.  Feeling down, depressed, or hopeless 1   3.  Trouble falling or staying asleep, or sleeping too much 0   4.  Feeling tired or having little energy 1   5.  Poor appetite or overeating 0   6.  Feeling bad about yourself 1   7.  Trouble concentrating 1   8.  Moving slowly or restless 0   Q9: Thoughts of better off dead/self-harm past 2 weeks 0   PHQ-9 Total Score 5   Difficulty at work, home, or with people -     YANNA-7  6/17/2016   1. Feeling nervous, anxious, or on edge 2   2. Not being able to stop or control worrying 3   3. Worrying too much about different things 3   4. Trouble relaxing 3   5. Being so restless that it is hard to sit still 3   6. Becoming easily annoyed or irritable 2   7. Feeling afraid, as if something awful might happen 1   YANNA-7 Total Score 17   If you checked any problems, how difficult have they made it for you to do your work, take care of things at home, or get along with other people? Very difficult         Review of Systems   Constitutional, HEENT, cardiovascular, pulmonary, gi and gu systems are negative, except as otherwise noted.      Objective           Vitals:  No vitals were obtained today due to virtual visit.    Physical Exam   GENERAL: Healthy, alert and no distress  EYES: Eyes grossly normal to inspection.  No discharge or erythema, or obvious scleral/conjunctival abnormalities.  RESP: No audible wheeze, cough, or visible cyanosis.  No visible retractions or increased work of breathing.    SKIN: Visible skin clear. No significant rash, abnormal pigmentation or lesions.  NEURO: Cranial nerves grossly intact.  Mentation and speech appropriate for age.  PSYCH: Mentation appears normal, affect normal/bright, judgement and insight intact, normal speech and appearance well-groomed.          Video-Visit  Details    Type of service:  Video Visit    Video End Time:5:10 PM    Originating Location (pt. Location): Home    Distant Location (provider location):  [facility]     Platform used for Video Visit: [employer]

## 2022-02-07 ENCOUNTER — MYC MEDICAL ADVICE (OUTPATIENT)
Dept: FAMILY MEDICINE | Facility: CLINIC | Age: 32
End: 2022-02-07
Payer: COMMERCIAL

## 2022-02-07 DIAGNOSIS — F41.9 ANXIETY: ICD-10-CM

## 2022-02-08 NOTE — TELEPHONE ENCOUNTER
JF  Please see [employer] message  Increase dose of buspirone or take second dose early afternoon vs evenings?    Thank you,  Stella Moss RN  [place]

## 2022-02-09 RX ORDER — BUSPIRONE HYDROCHLORIDE 10 MG/1
10 TABLET ORAL 2 TIMES DAILY
Qty: 180 TABLET | Refills: 0 | Status: SHIPPED | OUTPATIENT
Start: 2022-02-09 | End: 2022-03-31

## 2022-02-09 NOTE — TELEPHONE ENCOUNTER
JF,  Please see below [employer] message and advise.  Pended 10mg BID if you approve  Thanks,  Aniya GILES RN

## 2022-03-30 ENCOUNTER — MYC MEDICAL ADVICE (OUTPATIENT)
Dept: FAMILY MEDICINE | Facility: CLINIC | Age: 32
End: 2022-03-30
Payer: COMMERCIAL

## 2022-03-30 DIAGNOSIS — F41.9 ANXIETY: ICD-10-CM

## 2022-03-30 DIAGNOSIS — G89.29 CHRONIC PAIN OF BOTH KNEES: Primary | ICD-10-CM

## 2022-03-30 DIAGNOSIS — M25.562 CHRONIC PAIN OF BOTH KNEES: Primary | ICD-10-CM

## 2022-03-30 DIAGNOSIS — M25.561 CHRONIC PAIN OF BOTH KNEES: Primary | ICD-10-CM

## 2022-03-30 DIAGNOSIS — G89.29 CHRONIC MIDLINE LOW BACK PAIN WITHOUT SCIATICA: ICD-10-CM

## 2022-03-30 DIAGNOSIS — M54.50 CHRONIC MIDLINE LOW BACK PAIN WITHOUT SCIATICA: ICD-10-CM

## 2022-03-31 RX ORDER — BUSPIRONE HYDROCHLORIDE 15 MG/1
15 TABLET ORAL 2 TIMES DAILY
Qty: 60 TABLET | Refills: 3 | Status: SHIPPED | OUTPATIENT
Start: 2022-03-31 | End: 2022-05-03

## 2022-05-03 ENCOUNTER — VIRTUAL VISIT (OUTPATIENT)
Dept: FAMILY MEDICINE | Facility: CLINIC | Age: 32
End: 2022-05-03
Payer: COMMERCIAL

## 2022-05-03 DIAGNOSIS — F41.9 ANXIETY: ICD-10-CM

## 2022-05-03 DIAGNOSIS — F98.8 ATTENTION DEFICIT DISORDER (ADD) WITHOUT HYPERACTIVITY: Primary | ICD-10-CM

## 2022-05-03 PROCEDURE — 99214 OFFICE O/P EST MOD 30 MIN: CPT | Mod: 95 | Performed by: FAMILY MEDICINE

## 2022-05-03 RX ORDER — DEXTROAMPHETAMINE SACCHARATE, AMPHETAMINE ASPARTATE MONOHYDRATE, DEXTROAMPHETAMINE SULFATE AND AMPHETAMINE SULFATE 7.5; 7.5; 7.5; 7.5 MG/1; MG/1; MG/1; MG/1
30 CAPSULE, EXTENDED RELEASE ORAL DAILY
Qty: 30 CAPSULE | Refills: 0 | Status: SHIPPED | OUTPATIENT
Start: 2022-06-03 | End: 2022-07-03

## 2022-05-03 RX ORDER — BUSPIRONE HYDROCHLORIDE 15 MG/1
15 TABLET ORAL 2 TIMES DAILY
Qty: 180 TABLET | Refills: 3 | Status: SHIPPED | OUTPATIENT
Start: 2022-05-03 | End: 2022-08-10

## 2022-05-03 RX ORDER — DEXTROAMPHETAMINE SACCHARATE, AMPHETAMINE ASPARTATE MONOHYDRATE, DEXTROAMPHETAMINE SULFATE AND AMPHETAMINE SULFATE 7.5; 7.5; 7.5; 7.5 MG/1; MG/1; MG/1; MG/1
30 CAPSULE, EXTENDED RELEASE ORAL DAILY
Qty: 30 CAPSULE | Refills: 0 | Status: SHIPPED | OUTPATIENT
Start: 2022-07-04 | End: 2022-08-03

## 2022-05-03 RX ORDER — DEXTROAMPHETAMINE SACCHARATE, AMPHETAMINE ASPARTATE MONOHYDRATE, DEXTROAMPHETAMINE SULFATE AND AMPHETAMINE SULFATE 7.5; 7.5; 7.5; 7.5 MG/1; MG/1; MG/1; MG/1
30 CAPSULE, EXTENDED RELEASE ORAL DAILY
Qty: 30 CAPSULE | Refills: 0 | Status: SHIPPED | OUTPATIENT
Start: 2022-05-03 | End: 2022-06-02

## 2022-05-03 ASSESSMENT — ANXIETY QUESTIONNAIRES
7. FEELING AFRAID AS IF SOMETHING AWFUL MIGHT HAPPEN: NOT AT ALL
5. BEING SO RESTLESS THAT IT IS HARD TO SIT STILL: SEVERAL DAYS
GAD7 TOTAL SCORE: 5
4. TROUBLE RELAXING: SEVERAL DAYS
2. NOT BEING ABLE TO STOP OR CONTROL WORRYING: SEVERAL DAYS
1. FEELING NERVOUS, ANXIOUS, OR ON EDGE: NOT AT ALL
3. WORRYING TOO MUCH ABOUT DIFFERENT THINGS: SEVERAL DAYS
GAD7 TOTAL SCORE: 5
GAD7 TOTAL SCORE: 5
6. BECOMING EASILY ANNOYED OR IRRITABLE: SEVERAL DAYS
7. FEELING AFRAID AS IF SOMETHING AWFUL MIGHT HAPPEN: NOT AT ALL

## 2022-05-03 ASSESSMENT — PATIENT HEALTH QUESTIONNAIRE - PHQ9
SUM OF ALL RESPONSES TO PHQ QUESTIONS 1-9: 4
10. IF YOU CHECKED OFF ANY PROBLEMS, HOW DIFFICULT HAVE THESE PROBLEMS MADE IT FOR YOU TO DO YOUR WORK, TAKE CARE OF THINGS AT HOME, OR GET ALONG WITH OTHER PEOPLE: SOMEWHAT DIFFICULT
SUM OF ALL RESPONSES TO PHQ QUESTIONS 1-9: 4

## 2022-05-03 NOTE — PROGRESS NOTES
All is a 31 year old who is being evaluated via a billable video visit.      How would you like to obtain your AVS? MyChart  If the video visit is dropped, the invitation should be resent by: Text to cell phone: [phone]  Will anyone else be joining your video visit? No    Video Start Time: 11:25 AM    Assessment & Plan     Anxiety  Improved on this, continue  - busPIRone (BUSPAR) 15 MG tablet; Take 1 tablet (15 mg) by mouth 2 times daily    Attention deficit disorder (ADD) without hyperactivity  stable  - amphetamine-dextroamphetamine (ADDERALL XR) 30 MG 24 hr capsule; Take 1 capsule (30 mg) by mouth daily  - amphetamine-dextroamphetamine (ADDERALL XR) 30 MG 24 hr capsule; Take 1 capsule (30 mg) by mouth daily  - amphetamine-dextroamphetamine (ADDERALL XR) 30 MG 24 hr capsule; Take 1 capsule (30 mg) by mouth daily    Needs annual ftf visit  Cannot continue to refill schedule II without ftf visit    Return in about 3 months (around 8/3/2022) for Physical Exam.    Bj Correia MD  [facility]   All is a 31 year old who presents for the following health issues     History of Present Illness       Mental Health Follow-up:  Patient presents to follow-up on Depression & Anxiety.Patient's depression since last visit has been:  Better  The patient is not having other symptoms associated with depression.  Patient's anxiety since last visit has been:  Better  The patient is not having other symptoms associated with anxiety.  Any significant life events: No  Patient is not feeling anxious or having panic attacks.  Patient has no concerns about alcohol or drug use.       Today's PHQ-9         PHQ-9 Total Score: 4  PHQ-9 Q9 Thoughts of better off dead/self-harm past 2 weeks :   (P) Not at all    How difficult have these problems made it for you to do your work, take care of things at home, or get along with other people: Somewhat difficult    Today's YANNA-7 Score:  "5    Reason for visit:  ADD Medication renewal  Symptom onset:  More than a month  Symptoms include:  ADD  Symptom intensity:  Moderate  Symptom progression:  Staying the same  Had these symptoms before:  Yes  Has tried/received treatment for these symptoms:  Yes  Previous treatment was successful:  Yes  Prior treatment description:  Bianca    He eats 2-3 servings of fruits and vegetables daily.He consumes 3 sweetened beverage(s) daily.He exercises with enough effort to increase his heart rate 20 to 29 minutes per day.  He exercises with enough effort to increase his heart rate 4 days per week.   He is taking medications regularly.             Review of Systems   Constitutional, HEENT, cardiovascular, pulmonary, gi and gu systems are negative, except as otherwise noted.      Objective    Vitals - Patient Reported  Weight (Patient Reported): 97.5 kg (215 lb)  Height (Patient Reported): 188 cm (6' 2\")  BMI (Based on Pt Reported Ht/Wt): 27.6  Pain Score: Moderate Pain (4)  Pain Loc: Hand      Vitals:  No vitals were obtained today due to virtual visit.    Physical Exam   GENERAL: Healthy, alert and no distress  EYES: Eyes grossly normal to inspection.  No discharge or erythema, or obvious scleral/conjunctival abnormalities.  RESP: No audible wheeze, cough, or visible cyanosis.  No visible retractions or increased work of breathing.    SKIN: Visible skin clear. No significant rash, abnormal pigmentation or lesions.  NEURO: Cranial nerves grossly intact.  Mentation and speech appropriate for age.  PSYCH: Mentation appears normal, affect normal/bright, judgement and insight intact, normal speech and appearance well-groomed.                Video-Visit Details    Type of service:  Video Visit    Video End Time:11:33 AM    Originating Location (pt. Location): Home    Distant Location (provider location):  [facility]     Platform used for Video Visit: [employer]"

## 2022-05-04 ASSESSMENT — ANXIETY QUESTIONNAIRES: GAD7 TOTAL SCORE: 5

## 2022-05-04 ASSESSMENT — PATIENT HEALTH QUESTIONNAIRE - PHQ9: SUM OF ALL RESPONSES TO PHQ QUESTIONS 1-9: 4

## 2022-05-06 NOTE — PROGRESS NOTES
"ASSESSMENT & PLAN  Patient Instructions     1. Chronic pain of right knee      -Patient has chronic knee pain and history of multiple injuries since high school likely due to cartilage and or meniscus injuries  -Patient will get an MRI of the right knee for further evaluation of intra-articular structures.  - Your MRI has been ordered. You may call [phone] to schedule over the phone. Please call my office to schedule follow-up phone visit 2 days after your MRI is complete to discuss results and next of treatment options.  -Call direct clinic number [[phone]] at any time with questions or concerns.    Albert Yeo MD [facility]  [facility]          -----    SUBJECTIVE  All Barboza is a/an 31 year old male who is seen in consultation at the request of  Bj Correia M.D. for evaluation of right knee pain. The patient is seen by themselves.    Onset: 2 years(s) ago. Reports insidious onset without acute precipitating event.  Location of Pain: right knee, medial knee along joint line, also anterior knee   Rating of Pain at worst: 8/10  Rating of Pain Currently: 0/10   Worsened by: overuse - extended periods of walking/standing, deep knee flexion, pivoting   Better with: rest  Treatments tried: ice and ibuprofen  Associated symptoms: locking or catching, occasional buckling, had swelling in anterior knee (improved)  Orthopedic history: YES - Date: at age 15 tubing accident, \"couldn't walk\" for a few days. In high school kicked football and knee locked up on him, told bone bruise   Relevant surgical history: NO  Social history: social history: owns carpentry company     Past Medical History:   Diagnosis Date     Depressive disorder 2016    Mild     Social History     Socioeconomic History     Marital status:    Tobacco Use     Smoking status: Never Smoker     Smokeless tobacco: Never Used   Vaping Use     Vaping Use: Never used   Substance " "and Sexual Activity     Alcohol use: Yes     Alcohol/week: 0.0 standard drinks     Comment: social      Drug use: No     Sexual activity: Yes     Partners: Female     Birth control/protection: Natural Family Planning, None   Other Topics Concern     Parent/sibling w/ CABG, MI or angioplasty before 65F 55M? No         Patient's past medical, surgical, social, and family histories were reviewed today and no changes are noted.    REVIEW OF SYSTEMS:  10 point ROS is negative other than symptoms noted above in HPI, Past Medical History or as stated below  Constitutional: NEGATIVE for fever, chills, change in weight  Skin: NEGATIVE for worrisome rashes, moles or lesions  GI/: NEGATIVE for bowel or bladder changes  Neuro: NEGATIVE for weakness, dizziness or paresthesias    OBJECTIVE:  /68   Ht 1.88 m (6' 2\")   Wt 101.8 kg (224 lb 6.4 oz)   BMI 28.81 kg/m     General: healthy, alert and in no distress  HEENT: no scleral icterus or conjunctival erythema  Skin: no suspicious lesions or rash. No jaundice.  CV: no pedal edema  Resp: normal respiratory effort without conversational dyspnea   Psych: normal mood and affect  Gait: normal steady gait with appropriate coordination and balance  Neuro: Normal light sensory exam of lower extremity  MSK:  RIGHT KNEE  Inspection:    normal alignment  Palpation:    Tender about the medial patellar facet and medial joint line. Remainder of bony and ligamentous landmarks are nontender.    Trace effusion is present    Patellofemoral crepitus is Absent  Range of Motion:     00 extension to 1200 flexion  Strength:    Quadriceps grossly intact    Extensor mechanism intact  Special Tests:    Positive: Chelo's    Negative: Patellar grind, MCL/valgus stress (0 & 30 deg), LCL/varus stress (0 & 30 deg), Lachman's, anterior drawer, posterior drawer    Independent visualization of the below image:  Recent Results (from the past 24 hour(s))   XR Knee Standing AP Bilat [place] Bilat Lat " Right    Narrative    No acute fracture, dislocation or osseous abnormalities.         Albert Yeo MD CAQSM  [facility]

## 2022-05-08 ENCOUNTER — HEALTH MAINTENANCE LETTER (OUTPATIENT)
Age: 32
End: 2022-05-08

## 2022-05-09 ENCOUNTER — OFFICE VISIT (OUTPATIENT)
Dept: ORTHOPEDICS | Facility: CLINIC | Age: 32
End: 2022-05-09
Payer: COMMERCIAL

## 2022-05-09 VITALS
OXYGEN SATURATION: 96 % | DIASTOLIC BLOOD PRESSURE: 89 MMHG | HEART RATE: 94 BPM | BODY MASS INDEX: 28.23 KG/M2 | SYSTOLIC BLOOD PRESSURE: 135 MMHG | HEIGHT: 74 IN | WEIGHT: 220 LBS

## 2022-05-09 DIAGNOSIS — M54.12 CERVICAL RADICULOPATHY: Primary | ICD-10-CM

## 2022-05-09 DIAGNOSIS — M54.50 CHRONIC MIDLINE LOW BACK PAIN WITHOUT SCIATICA: ICD-10-CM

## 2022-05-09 DIAGNOSIS — G89.29 CHRONIC MIDLINE LOW BACK PAIN WITHOUT SCIATICA: ICD-10-CM

## 2022-05-09 PROCEDURE — 99204 OFFICE O/P NEW MOD 45 MIN: CPT | Performed by: PHYSICAL MEDICINE & REHABILITATION

## 2022-05-09 ASSESSMENT — PAIN SCALES - GENERAL: PAINLEVEL: MILD PAIN (3)

## 2022-05-09 NOTE — LETTER
5/9/2022         RE: All Babroza  [address]  [place] [address]        Dear Colleague,    Thank you for referring your patient, All Barboza, to the [facility]. Please see a copy of my visit note below.    PHYSICAL MEDICINE & REHABILITATION / MEDICAL SPINE        Date:  May 9, 2022    Name:  All Barboza  YOB: 1990  MRN:  [id number]          REASON FOR CONSULTATION:  Chronic midline low back pain without sciatica.        REFERRING PROVIDER:  Bj Correia MD        CHART REVIEW:  Reviewed MyChart notes between Bj Correia MD (family medicine) and Mr. All Barboza dated 03/30/2022.  Mr. All Barboza had been having back and knee issues since he was in college.  He is expecting to have a baby in October 2022.  Mr. All Barboza requested referrals for his back pain and knee pain.        CHIEF COMPLAINT:  Neck pain with radiation into the left upper extremity.        HISTORY OF PRESENT ILLNESS:  Mr. All Barboza is a 31-year-old right-hand-dominant male.  He owns a finish carpentry company.    On 05/01/2022, Mr. All Barboza was working on installing a deck at his house.  He fell off the deck roughly 5 feet landing on his left wrist and forearm.  He still has some left wrist pain but states it has significantly improved in the last 8 days.  Mr. All Barboza denies any other injuries of his head, neck, upper back, shoulders, arms, elbows, forearms, wrists, hands, fingers.    Mr. All Barboza was cracking his neck just over 1 year ago.  He felt a pain in his left posterolateral neck that extends down into his left upper extremity.  There was initially numbness and tingling from the neck extending down the left upper extremity into the left second, third, and fourth fingers.  Numbness and tingling has changed over time, and numbness and tingling is now just in the left second finger.  Neck pain is intermittent.  Neck pain  "extends to the left shoulder/upper back and radiates down the left upper extremity.  Paresthesias in the left upper extremity are intermittent.  Pain is described as \"soreness, shoots.\"  Left posterolateral neck pain with radiation to left upper extremity is intermittent and just develops with left Spurling's maneuver.  Numbness and tingling in the left second finger is constant.  Pain is currently rated as 3/10.  Pain varies from 3/10 to 10/10    Left posterolateral neck pain that radiates into the left upper extremity has not changed since it began.  Mr. All Barboza no longer cracks his neck.  His pain is worse with coughing and sneezing.  Driving and hitting a pothole does worsen his neck pain.  Neck pain does not keep Mr. Barboza awake nor wake him.  Mr. Barboza is not taking any pain medications.    Mr. All Barboza  has not tried acupuncture, injections, physical therapy.  Massage did not provide any benefit.  Ms. Barboza sees a chiropractor once per week for his mid and low back.  His chiropractor treatments have not been helpful for his neck pain.    Mr. All Barboza denies any lightheadedness, dizziness, balance problems.  He denies any weakness.  He denies dropping objects.  He denies any changes in his neck pain or left upper extremity pain with overhead activities.  Mr. All Barboza denies any catching, locking, popping.  He denies any bruising, redness, swelling.  Mr. Barboza denies any give way episodes of his lower extremities.  He denies any tripping, stumbling, falling.  Mr. Barboza denies any other numbness, tingling, pins-and-needles.  He denies any saddle anesthesia, bowel incontinence, bladder incontinence.    Mr. All Barboza denies any personal or family history of autoimmune diseases, rheumatologic diseases, gout, pseudogout.  He denies any personal family history of neurologic diseases.  Mr. Barboza denies any personal or family history of inflammatory bowel " diseases.        REVIEW OF SYSTEMS:  As detailed in history of present illness.        ALLERGIES:  No Known Allergies      MEDICATIONS:  Current Outpatient Medications   Medication Sig Dispense Refill     ADDERALL XR 30 MG 24 hr capsule        aluminum chloride (DRYSOL) 20 % external solution Apply topically At Bedtime 35 mL 11     amphetamine-dextroamphetamine (ADDERALL XR) 30 MG 24 hr capsule Take 1 capsule (30 mg) by mouth daily 30 capsule 0     [START ON 6/3/2022] amphetamine-dextroamphetamine (ADDERALL XR) 30 MG 24 hr capsule Take 1 capsule (30 mg) by mouth daily 30 capsule 0     [START ON 7/4/2022] amphetamine-dextroamphetamine (ADDERALL XR) 30 MG 24 hr capsule Take 1 capsule (30 mg) by mouth daily 30 capsule 0     busPIRone (BUSPAR) 15 MG tablet Take 1 tablet (15 mg) by mouth 2 times daily 180 tablet 3         PAST MEDICAL HISTORY:  Past Medical History:   Diagnosis Date     Depressive disorder 2016    Mild         PAST SURGICAL HISTORY:  History reviewed. No pertinent surgical history.      FAMILY HISTORY:  Family History   Problem Relation Age of Onset     Breast Cancer Mother      Depression Mother          SOCIAL HISTORY:  Social History     Socioeconomic History     Marital status:      Spouse name: Not on file     Number of children: Not on file     Years of education: Not on file     Highest education level: Not on file   Occupational History     Not on file   Tobacco Use     Smoking status: Never Smoker     Smokeless tobacco: Never Used   Vaping Use     Vaping Use: Never used   Substance and Sexual Activity     Alcohol use: Yes     Alcohol/week: 0.0 standard drinks     Comment: social      Drug use: No     Sexual activity: Yes     Partners: Female     Birth control/protection: Natural Family Planning, None   Other Topics Concern     Parent/sibling w/ CABG, MI or angioplasty before 65F 55M? No   Social History Narrative     Not on file     Social Determinants of Health     Financial Resource  "Strain: Not on file   Food Insecurity: Not on file   Transportation Needs: Not on file   Physical Activity: Not on file   Stress: Not on file   Social Connections: Not on file   Intimate Partner Violence: Not on file   Housing Stability: Not on file         PHYSICAL EXAMINATION:  Vitals:    05/09/22 1343   BP: 135/89   Pulse: 94   SpO2: 96%   Weight: 99.8 kg (220 lb)   Height: 1.88 m (6' 2\")       GENERAL:  No acute distress.  Pleasant and cooperative.   PSYCH:  Normal mood and affect.  HEAD:  Normocephalic.  SPEECH:  No dysarthria.  EYES:  No scleral icterus.  EARS:  Hearing is intact to spoken voice.  NOSE/MOUTH:  Wearing a face mask.  LUNGS:  No respiratory distress.  No increased work of breathing.  VASCULAR/PULSES:  Radial:  Right 2+.  Left 2+.  UPPER EXTREMITIES:  No clubbing, cyanosis, or edema bilaterally.    BALANCE AND GAIT:   Patient has a reciprocal gait pattern without antalgia.  He is able to heel walk, toe walk, and tandem walk without difficulty.    INSPECTION:  There is no erythema, ecchymosis, deformity, asymmetry, or abnormality of the neck.    CERVICAL PALPATION:  Inion: Not tender.  Greater Occipital Nerve:  Right not tender.  Left not tender.  Lesser Occipital Nerve:  Right not tender.  Left not tender.  Cervical Spinous Processes:  not tender.  Cervical Paraspinals:  Right not tender.  Left not tender.  Splenius Capitis:  Right not tender.  Left not tender.  Levator Scapulae at the Neck:  Right not tender.  Left not tender.  Cervical Facet Joints:  Right not tender.  Left not tender.  Longissimus:  Right not tender.  Left not tender.  Anterior, Middle, Posterior Scalenes:  Right not tender.  Left not tender.  Sternocleidomastoid:  Right not tender.  Left not tender.  Trapezius:  Right not tender.  Left not tender.    THORACIC PALPATION:  Thoracic Spinous Processes:  not tender.  Thoracic Paraspinals:  Right not tender.  Left mild tenderness T4-T8.  Levator Scapulae at the Scapular Insertion:  " Right not tender.  Left not tender.  Rhomboid Minor:  Right mild tenderness.  Left not tender.  Rhomboid Major:  Right mild tenderness.  Left not tender.    CERVICAL RANGE OF MOTION:  Forward Flexion (40 ): Normal range of motion, does not cause pain, does not cause radiating pain.  Extension (40 ): Normal range of motion, causes slight posterior neck pain, does not cause radiating pain.  Rotating Right (45 ): Normal range of motion, causes slight left posterolateral neck pain, does not cause radiating pain.  Rotating Left (45 ): Normal range of motion, does not cause pain, does not cause radiating pain.  Lateral Bending Right (40 ): Normal range of motion, causes slight left posterolateral neck pain, does not cause radiating pain.  Lateral Bending Left (40 ): Normal range of motion, does not cause pain, does not cause radiating pain.    SHOULDER RANGE OF MOTION:  Active Forward Flexion (180 ):  Right 180 .  Left 180 .  Active Extension (60 ):  Right 60 .  Left 60 .  Active Abduction (180 ):  Right 180 .  Left 180 .  Scapular Dyskinesis:  Right slight.  Left -.    STRENGTH:  Shoulder abduction:  Right 5/5.  Left 5/5.  Elbow flexion:  Right 5/5.  Left 5/5.  Wrist extension:  Right 5/5.  Left 4/5 secondary to recent left wrist injury.  Elbow extension:  Right 5/5.  Left 5/5.  Wrist flexion:  Right 5/5.  Left 4/5 secondary to recent left wrist injury.  Finger flexion:  Right 5/5.  Left 4/5 secondary to recent left wrist injury.  Finger abduction:  Right 5/5.  Left 4/5 secondary to recent left wrist injury.    SENSATION:  Intact to light touch along right C3, C4, C5, C6, C7, C8, T1, T2.  Intact to light touch along left C3, C4, C5, C6, C7, C8, T1, T2.    REFLEXES:  Biceps (C5-C6):  Right 2+.  Left 2+.  Brachioradialis (C5-C6):  Right 2+.  Left 2+.  Triceps (C7-C8):  Right 2+.  Left 2+.  Brito's:  Right -.  Left -.    CERVICAL SPECIAL TESTS:  Facet Loading:  Right +.  Left +.  Spurling Neck Compression:  Right -.  Left  +.    SHOULDER SPECIAL TESTS:  Drop Arm:  Right - . Left -.    WRIST/HAND SPECIAL TESTS:  Tinel's at cubital tunnel:  Right -.  Left -.  Tinel's at Guyon's canal:  Right -.  Left -.  Tinel's at carpal tunnel:  Right -.  Left -.  Carpal compression:  Right -.  Left -.        IMAGING:  There is no imaging available.        ASSESSMENT/PLAN:  Mr. All Barboza is a 31-year-old, right-hand dominant, male.  History and exam are most consistent with left cervical radiculopathy (C7, possibly C6).  Discussed the natural course of disc herniations with Mr. Barboza.  Discussed diagnosis, pathophysiology, and treatment options with Mr. Mortensen.  Discussed the options of doing nothing/living with it, physical therapy, chiropractic care, oral medications such as gabapentin and pregabalin, cervical epidural corticosteroid injection, referral to neurosurgery.  Mr. Barboza is being sent for an MRI of his cervical spine.  He is to follow-up in this clinic after the MRI cervical spine.    Mr. All Barboza  also complains of mid back and low back pain.  He will be set up in this clinic for an appointment in this clinic to be seen for his mid back and low back pain at his convenience.        Liam Mayen MD

## 2022-05-09 NOTE — PATIENT INSTRUCTIONS
Please schedule an MRI.  The [facility] Imaging Scheduling team will call you to schedule your imaging exam in the next 0-3 days.  You can also call them directly at [facility] [phone] ([address], [address], [place], [place]) and [facility] [phone] ([address], [place], [place]) to schedule your appointment.    Please schedule an appointment to be seen for low back pain at your convenience.  You can schedule this at the , or you can call [phone].

## 2022-05-09 NOTE — PROGRESS NOTES
"PHYSICAL MEDICINE & REHABILITATION / MEDICAL SPINE        Date:  May 9, 2022    Name:  All Barboza  YOB: 1990  MRN:  [id number]          REASON FOR CONSULTATION:  Chronic midline low back pain without sciatica.        REFERRING PROVIDER:  Bj Correia MD        CHART REVIEW:  Reviewed MyChart notes between Bj Correia MD (family medicine) and Mr. All Barboza dated 03/30/2022.  Mr. All Barboza had been having back and knee issues since he was in college.  He is expecting to have a baby in October 2022.  Mr. All Barboza requested referrals for his back pain and knee pain.        CHIEF COMPLAINT:  Neck pain with radiation into the left upper extremity.        HISTORY OF PRESENT ILLNESS:  Mr. All Barboza is a 31-year-old right-hand-dominant male.  He owns a finish carpentry company.    On 05/01/2022, Mr. All Barboza was working on installing a deck at his house.  He fell off the deck roughly 5 feet landing on his left wrist and forearm.  He still has some left wrist pain but states it has significantly improved in the last 8 days.  Mr. All Barboza denies any other injuries of his head, neck, upper back, shoulders, arms, elbows, forearms, wrists, hands, fingers.    Mr. All Barboza was cracking his neck just over 1 year ago.  He felt a pain in his left posterolateral neck that extends down into his left upper extremity.  There was initially numbness and tingling from the neck extending down the left upper extremity into the left second, third, and fourth fingers.  Numbness and tingling has changed over time, and numbness and tingling is now just in the left second finger.  Neck pain is intermittent.  Neck pain extends to the left shoulder/upper back and radiates down the left upper extremity.  Paresthesias in the left upper extremity are intermittent.  Pain is described as \"soreness, shoots.\"  Left posterolateral neck pain with radiation to left upper " extremity is intermittent and just develops with left Spurling's maneuver.  Numbness and tingling in the left second finger is constant.  Pain is currently rated as 3/10.  Pain varies from 3/10 to 10/10    Left posterolateral neck pain that radiates into the left upper extremity has not changed since it began.  Mr. All Barboza no longer cracks his neck.  His pain is worse with coughing and sneezing.  Driving and hitting a pothole does worsen his neck pain.  Neck pain does not keep Mr. Barboza awake nor wake him.  Mr. Barboza is not taking any pain medications.    Mr. All Barboza  has not tried acupuncture, injections, physical therapy.  Massage did not provide any benefit.  Ms. Barboza sees a chiropractor once per week for his mid and low back.  His chiropractor treatments have not been helpful for his neck pain.    Mr. All Barboza denies any lightheadedness, dizziness, balance problems.  He denies any weakness.  He denies dropping objects.  He denies any changes in his neck pain or left upper extremity pain with overhead activities.  Mr. All Barboza denies any catching, locking, popping.  He denies any bruising, redness, swelling.  Mr. Barboza denies any give way episodes of his lower extremities.  He denies any tripping, stumbling, falling.  Mr. Babroza denies any other numbness, tingling, pins-and-needles.  He denies any saddle anesthesia, bowel incontinence, bladder incontinence.    Mr. All Barboza denies any personal or family history of autoimmune diseases, rheumatologic diseases, gout, pseudogout.  He denies any personal family history of neurologic diseases.  Mr. Barboza denies any personal or family history of inflammatory bowel diseases.        REVIEW OF SYSTEMS:  As detailed in history of present illness.        ALLERGIES:  No Known Allergies      MEDICATIONS:  Current Outpatient Medications   Medication Sig Dispense Refill     ADDERALL XR 30 MG 24 hr capsule        aluminum chloride  (DRYSOL) 20 % external solution Apply topically At Bedtime 35 mL 11     amphetamine-dextroamphetamine (ADDERALL XR) 30 MG 24 hr capsule Take 1 capsule (30 mg) by mouth daily 30 capsule 0     [START ON 6/3/2022] amphetamine-dextroamphetamine (ADDERALL XR) 30 MG 24 hr capsule Take 1 capsule (30 mg) by mouth daily 30 capsule 0     [START ON 7/4/2022] amphetamine-dextroamphetamine (ADDERALL XR) 30 MG 24 hr capsule Take 1 capsule (30 mg) by mouth daily 30 capsule 0     busPIRone (BUSPAR) 15 MG tablet Take 1 tablet (15 mg) by mouth 2 times daily 180 tablet 3         PAST MEDICAL HISTORY:  Past Medical History:   Diagnosis Date     Depressive disorder 2016    Mild         PAST SURGICAL HISTORY:  History reviewed. No pertinent surgical history.      FAMILY HISTORY:  Family History   Problem Relation Age of Onset     Breast Cancer Mother      Depression Mother          SOCIAL HISTORY:  Social History     Socioeconomic History     Marital status:      Spouse name: Not on file     Number of children: Not on file     Years of education: Not on file     Highest education level: Not on file   Occupational History     Not on file   Tobacco Use     Smoking status: Never Smoker     Smokeless tobacco: Never Used   Vaping Use     Vaping Use: Never used   Substance and Sexual Activity     Alcohol use: Yes     Alcohol/week: 0.0 standard drinks     Comment: social      Drug use: No     Sexual activity: Yes     Partners: Female     Birth control/protection: Natural Family Planning, None   Other Topics Concern     Parent/sibling w/ CABG, MI or angioplasty before 65F 55M? No   Social History Narrative     Not on file     Social Determinants of Health     Financial Resource Strain: Not on file   Food Insecurity: Not on file   Transportation Needs: Not on file   Physical Activity: Not on file   Stress: Not on file   Social Connections: Not on file   Intimate Partner Violence: Not on file   Housing Stability: Not on file  "        PHYSICAL EXAMINATION:  Vitals:    05/09/22 1343   BP: 135/89   Pulse: 94   SpO2: 96%   Weight: 99.8 kg (220 lb)   Height: 1.88 m (6' 2\")       GENERAL:  No acute distress.  Pleasant and cooperative.   PSYCH:  Normal mood and affect.  HEAD:  Normocephalic.  SPEECH:  No dysarthria.  EYES:  No scleral icterus.  EARS:  Hearing is intact to spoken voice.  NOSE/MOUTH:  Wearing a face mask.  LUNGS:  No respiratory distress.  No increased work of breathing.  VASCULAR/PULSES:  Radial:  Right 2+.  Left 2+.  UPPER EXTREMITIES:  No clubbing, cyanosis, or edema bilaterally.    BALANCE AND GAIT:   Patient has a reciprocal gait pattern without antalgia.  He is able to heel walk, toe walk, and tandem walk without difficulty.    INSPECTION:  There is no erythema, ecchymosis, deformity, asymmetry, or abnormality of the neck.    CERVICAL PALPATION:  Inion: Not tender.  Greater Occipital Nerve:  Right not tender.  Left not tender.  Lesser Occipital Nerve:  Right not tender.  Left not tender.  Cervical Spinous Processes:  not tender.  Cervical Paraspinals:  Right not tender.  Left not tender.  Splenius Capitis:  Right not tender.  Left not tender.  Levator Scapulae at the Neck:  Right not tender.  Left not tender.  Cervical Facet Joints:  Right not tender.  Left not tender.  Longissimus:  Right not tender.  Left not tender.  Anterior, Middle, Posterior Scalenes:  Right not tender.  Left not tender.  Sternocleidomastoid:  Right not tender.  Left not tender.  Trapezius:  Right not tender.  Left not tender.    THORACIC PALPATION:  Thoracic Spinous Processes:  not tender.  Thoracic Paraspinals:  Right not tender.  Left mild tenderness T4-T8.  Levator Scapulae at the Scapular Insertion:  Right not tender.  Left not tender.  Rhomboid Minor:  Right mild tenderness.  Left not tender.  Rhomboid Major:  Right mild tenderness.  Left not tender.    CERVICAL RANGE OF MOTION:  Forward Flexion (40 ): Normal range of motion, does not cause " pain, does not cause radiating pain.  Extension (40 ): Normal range of motion, causes slight posterior neck pain, does not cause radiating pain.  Rotating Right (45 ): Normal range of motion, causes slight left posterolateral neck pain, does not cause radiating pain.  Rotating Left (45 ): Normal range of motion, does not cause pain, does not cause radiating pain.  Lateral Bending Right (40 ): Normal range of motion, causes slight left posterolateral neck pain, does not cause radiating pain.  Lateral Bending Left (40 ): Normal range of motion, does not cause pain, does not cause radiating pain.    SHOULDER RANGE OF MOTION:  Active Forward Flexion (180 ):  Right 180 .  Left 180 .  Active Extension (60 ):  Right 60 .  Left 60 .  Active Abduction (180 ):  Right 180 .  Left 180 .  Scapular Dyskinesis:  Right slight.  Left -.    STRENGTH:  Shoulder abduction:  Right 5/5.  Left 5/5.  Elbow flexion:  Right 5/5.  Left 5/5.  Wrist extension:  Right 5/5.  Left 4/5 secondary to recent left wrist injury.  Elbow extension:  Right 5/5.  Left 5/5.  Wrist flexion:  Right 5/5.  Left 4/5 secondary to recent left wrist injury.  Finger flexion:  Right 5/5.  Left 4/5 secondary to recent left wrist injury.  Finger abduction:  Right 5/5.  Left 4/5 secondary to recent left wrist injury.    SENSATION:  Intact to light touch along right C3, C4, C5, C6, C7, C8, T1, T2.  Intact to light touch along left C3, C4, C5, C6, C7, C8, T1, T2.    REFLEXES:  Biceps (C5-C6):  Right 2+.  Left 2+.  Brachioradialis (C5-C6):  Right 2+.  Left 2+.  Triceps (C7-C8):  Right 2+.  Left 2+.  Brito's:  Right -.  Left -.    CERVICAL SPECIAL TESTS:  Facet Loading:  Right +.  Left +.  Spurling Neck Compression:  Right -.  Left +.    SHOULDER SPECIAL TESTS:  Drop Arm:  Right - . Left -.    WRIST/HAND SPECIAL TESTS:  Tinel's at cubital tunnel:  Right -.  Left -.  Tinel's at Guyon's canal:  Right -.  Left -.  Tinel's at carpal tunnel:  Right -.  Left -.  Carpal  compression:  Right -.  Left -.        IMAGING:  There is no imaging available.        ASSESSMENT/PLAN:  Mr. All Barboza is a 31-year-old, right-hand dominant, male.  History and exam are most consistent with left cervical radiculopathy (C7, possibly C6).  Discussed the natural course of disc herniations with Mr. Barboza.  Discussed diagnosis, pathophysiology, and treatment options with Mr. Mortensen.  Discussed the options of doing nothing/living with it, physical therapy, chiropractic care, oral medications such as gabapentin and pregabalin, cervical epidural corticosteroid injection, referral to neurosurgery.  Mr. Barboza is being sent for an MRI of his cervical spine.  He is to follow-up in this clinic after the MRI cervical spine.    Mr. All Barboza  also complains of mid back and low back pain.  He will be set up in this clinic for an appointment in this clinic to be seen for his mid back and low back pain at his convenience.        Liam Mayen MD

## 2022-05-11 ENCOUNTER — ANCILLARY PROCEDURE (OUTPATIENT)
Dept: GENERAL RADIOLOGY | Facility: CLINIC | Age: 32
End: 2022-05-11
Attending: FAMILY MEDICINE
Payer: COMMERCIAL

## 2022-05-11 ENCOUNTER — OFFICE VISIT (OUTPATIENT)
Dept: ORTHOPEDICS | Facility: CLINIC | Age: 32
End: 2022-05-11

## 2022-05-11 VITALS
WEIGHT: 224.4 LBS | DIASTOLIC BLOOD PRESSURE: 68 MMHG | SYSTOLIC BLOOD PRESSURE: 126 MMHG | HEIGHT: 74 IN | BODY MASS INDEX: 28.8 KG/M2

## 2022-05-11 DIAGNOSIS — G89.29 CHRONIC PAIN OF RIGHT KNEE: Primary | ICD-10-CM

## 2022-05-11 DIAGNOSIS — M25.561 CHRONIC PAIN OF RIGHT KNEE: ICD-10-CM

## 2022-05-11 DIAGNOSIS — M25.561 CHRONIC PAIN OF RIGHT KNEE: Primary | ICD-10-CM

## 2022-05-11 DIAGNOSIS — G89.29 CHRONIC PAIN OF RIGHT KNEE: ICD-10-CM

## 2022-05-11 PROCEDURE — 73562 X-RAY EXAM OF KNEE 3: CPT | Performed by: FAMILY MEDICINE

## 2022-05-11 PROCEDURE — 99204 OFFICE O/P NEW MOD 45 MIN: CPT | Performed by: FAMILY MEDICINE
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
## 2022-05-11 NOTE — PATIENT INSTRUCTIONS
1. Chronic pain of right knee      -Patient has chronic knee pain and history of multiple injuries since high school likely due to cartilage and or meniscus injuries  -Patient will get an MRI of the right knee for further evaluation of intra-articular structures.  - Your MRI has been ordered. You may call [phone] to schedule over the phone. Please call my office to schedule follow-up phone visit 2 days after your MRI is complete to discuss results and next of treatment options.  -Call direct clinic number [[phone]] at any time with questions or concerns.    Albert Yeo MD [facility]  [facility]

## 2022-05-23 ENCOUNTER — OFFICE VISIT (OUTPATIENT)
Dept: ORTHOPEDICS | Facility: CLINIC | Age: 32
End: 2022-05-23
Payer: COMMERCIAL

## 2022-05-23 VITALS
OXYGEN SATURATION: 98 % | DIASTOLIC BLOOD PRESSURE: 75 MMHG | SYSTOLIC BLOOD PRESSURE: 124 MMHG | HEIGHT: 74 IN | BODY MASS INDEX: 28.75 KG/M2 | WEIGHT: 224 LBS | HEART RATE: 92 BPM

## 2022-05-23 DIAGNOSIS — M51.34 DDD (DEGENERATIVE DISC DISEASE), THORACIC: Primary | ICD-10-CM

## 2022-05-23 PROCEDURE — 99214 OFFICE O/P EST MOD 30 MIN: CPT | Performed by: PHYSICAL MEDICINE & REHABILITATION

## 2022-05-23 ASSESSMENT — PAIN SCALES - GENERAL: PAINLEVEL: NO PAIN (0)

## 2022-05-23 NOTE — PROGRESS NOTES
"PHYSICAL MEDICINE & REHABILITATION / MEDICAL SPINE        Date:  May 23, 2022    Name:  All Barboza  YOB: 1990  MRN:  [id number]          CHART REVIEW:  Reviewed MyChart notes between Bj Correia MD (family medicine) and Mr. All Barboza dated 03/30/2022.  Mr. All Barboza had been having back and knee issues since he was in college.  He is expecting to have a baby in October 2022.  Mr. All Barboza requested referrals for his back pain and knee pain.        CHIEF COMPLAINT:  Mid/low back pain.        HISTORY OF PRESENT ILLNESS:  Mr. All Barboza is a 31-year-old right-hand-dominant male.  He owns a finish carpentry company.     On 05/01/2022, Mr. All Barboza was working on installing a deck at his house.  He fell off the deck roughly 5 feet landing on his left wrist and forearm.  Mr. All Barboza denied any other injuries of his head, neck, upper back, shoulders, arms, elbows, forearms, wrists, hands, fingers.    Mr. All Barboza denied any personal or family history of autoimmune diseases, rheumatologic diseases, gout, pseudogout.  He denied any personal family history of neurologic diseases.  Mr. Barboza denied any personal or family history of inflammatory bowel diseases.    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  Mr. All Barboza would have to hammer these tubes into the ground.  He states 1 time when he was pulling the tube back out again he had a catch in his mid back and has had pain since that time.  Mr. Barboza has had a right knee knee injury and is being seen in sports medicine for that.  Mr. Barboza denies any other injuries of his upper back, mid back, low back, pelvis, hips, thighs, knees, legs, ankles, feet, toes.    Mr. All Barboza has intermittent pain in his thoracic back between T8 and T10.  This pain is described as \"sharp.\"  Pain is occurring every 1 to 2 months.  When it develops, " there are back spasms that radiate around the rib cage.  Episodes last 1 to 1-1/2 days.  There is no radiation of this pain proximally or distally.  Mr. All Barboza has been seeing a chiropractor once per week.  He finds that these treatments help.  Episodes of back spasms now occur proximately once per 6 months.  These episodes still last 1 to 1-1/2 days.  Mr. Barboza has not tried physical therapy, acupuncture, injections, massage.    Mr. All Barboza denies any weakness.  He denies any give way episodes of the lower extremities.  He denies any tripping, stumbling, falling.  Mr. Barboza denies any numbness, tingling, pins-and-needles.  He denies any saddle anesthesia, bowel incontinence, bladder incontinence.        REVIEW OF SYSTEMS:  Review of Systems     Constitutional:  Negative for fever, weight loss, weight gain and fatigue.   HENT:  Negative for tinnitus, trouble swallowing and hoarse voice.    Eyes:  Negative for pain, eye pain and decreased vision.   Respiratory:   Negative for cough, shortness of breath and wheezing.    Cardiovascular:  Positive for palpitations. Negative for chest pain and leg swelling.   Gastrointestinal:  Negative for heartburn, nausea, vomiting, abdominal pain, diarrhea, constipation, blood in stool and bowel incontinence.   Genitourinary:  Negative for bladder incontinence, dysuria, hematuria and difficulty urinating.   Musculoskeletal:  Positive for arthralgias. Negative for myalgias.   Skin:  Negative for itching, poor wound healing and poor wound healing.   Neurological:  Negative for dizziness, seizures, loss of consciousness, headaches and difficulty walking.   Endo/Heme:  Negative for anemia, swollen glands and bruises/bleeds easily.   Psychiatric/Behavioral:  Positive for depression.          ALLERGIES:  No Known Allergies      MEDICATIONS:  Current Outpatient Medications   Medication Sig Dispense Refill     ADDERALL XR 30 MG 24 hr capsule        aluminum chloride  (DRYSOL) 20 % external solution Apply topically At Bedtime 35 mL 11     amphetamine-dextroamphetamine (ADDERALL XR) 30 MG 24 hr capsule Take 1 capsule (30 mg) by mouth daily 30 capsule 0     [START ON 6/3/2022] amphetamine-dextroamphetamine (ADDERALL XR) 30 MG 24 hr capsule Take 1 capsule (30 mg) by mouth daily 30 capsule 0     [START ON 7/4/2022] amphetamine-dextroamphetamine (ADDERALL XR) 30 MG 24 hr capsule Take 1 capsule (30 mg) by mouth daily 30 capsule 0     busPIRone (BUSPAR) 15 MG tablet Take 1 tablet (15 mg) by mouth 2 times daily 180 tablet 3         PAST MEDICAL HISTORY:  Past Medical History:   Diagnosis Date     DDD (degenerative disc disease), thoracic 5/23/2022     Depressive disorder 2016    Mild         PAST SURGICAL HISTORY:  History reviewed. No pertinent surgical history.      FAMILY HISTORY:  Family History   Problem Relation Age of Onset     Breast Cancer Mother      Depression Mother          SOCIAL HISTORY:  Social History     Socioeconomic History     Marital status:      Spouse name: Not on file     Number of children: Not on file     Years of education: Not on file     Highest education level: Not on file   Occupational History     Not on file   Tobacco Use     Smoking status: Never Smoker     Smokeless tobacco: Never Used   Vaping Use     Vaping Use: Never used   Substance and Sexual Activity     Alcohol use: Yes     Alcohol/week: 0.0 standard drinks     Comment: social      Drug use: No     Sexual activity: Yes     Partners: Female     Birth control/protection: Natural Family Planning, None   Other Topics Concern     Parent/sibling w/ CABG, MI or angioplasty before 65F 55M? No   Social History Narrative     Not on file     Social Determinants of Health     Financial Resource Strain: Not on file   Food Insecurity: Not on file   Transportation Needs: Not on file   Physical Activity: Not on file   Stress: Not on file   Social Connections: Not on file   Intimate Partner Violence: Not  "on file   Housing Stability: Not on file         PHYSICAL EXAMINATION:  Vitals:    05/23/22 1511   BP: 124/75   Pulse: 92   SpO2: 98%   Weight: 101.6 kg (224 lb)   Height: 1.88 m (6' 2\")       GENERAL:  No acute distress.  Pleasant and cooperative.   PSYCH:  Normal mood and affect.  HEAD:  Normocephalic.  SPEECH:  No dysarthria.  EYES:  No scleral icterus.  EARS:  Hearing is intact to spoken voice.  NOSE/MOUTH:  Wearing a face mask.  LUNGS:  No respiratory distress.  No increased work of breathing.    BALANCE AND GAIT: The patient has reciprocal gait pattern without antalgia.  He is able to heel walk, toe walk, tandem walk without difficulty.  Double leg squat is performed with quadriceps dominant pattern.  Single-leg squat on the right has mild dynamic knee valgus.  Single-leg squat on the left has mild dynamic knee valgus.    INSPECTION:  There is no erythema, ecchymosis, deformity, asymmetry, or abnormality of the back.    THORACIC PALPATION:  Thoracic Spinous Processes: Not tender.  Thoracic Paraspinals:  Right not tender.  Left not tender.  Levator Scapulae at the Scapular Insertion:  Right not tender.  Left not tender.  Rhomboid Minor:  Right not tender.  Left not tender.  Rhomboid Major:  Right not tender.  Left not tender.    LUMBOPELVIC PALPATION:  Lumbar Spinous Processes:  not tender.  Lumbar Paraspinals:  Right not tender.  Left not tender.  Posterior Superior Iliac Spine:  Right not tender.  Left not tender.  Iliac Crest:  Right not tender.  Left not tender.  Sacroiliac Joint:  Right not tender.  Left not tender.  Hip External Rotators:  Right not tender.  Left not tender.  Ischial Tuberosity:  Right not tender.  Left not tender.  Greater Trochanter:  Right not tender.  Left not tender.  Coccyx:  not tender.    THORACOLUMBAR RANGE OF MOTION:  Forward flexion (85 ): Normal range of motion, does not cause pain, does not cause radiating pain.  Extension (30 ): Normal range of motion, increases back pain " from T8-T10, does not cause radiating pain.  Lateral bending right (30 ):  Normal range of motion, does not cause pain, does not cause radiating pain.  Lateral bending left (30 ):  Normal range of motion, does not cause pain, does not cause radiating pain.  Twisting right with extension:  Normal range of motion, does not cause pain, does not cause radiating pain.  Twisting left with extension:  Normal range of motion, does not cause pain, does not cause radiating pain.  Sacroiliac joint dysfunction:  Right -.  Left -.        IMAGING:  There is no thoracic spine imaging available.        ASSESSMENT/PLAN:  Mr. All Barboza is a 31-year-old, right-hand-dominant, male.  His thoracic back pain represents a chronic myofascial strain versus chronic thoracic degenerative disc disease.  Discussed diagnosis, pathophysiology, and treatment options with Mr. Barboza.  Discussed the options of doing nothing/living with, physical therapy, chiropractic care, oral medications such as gabapentin and pregabalin, thoracic epidural corticosteroid injection, referral to neurosurgery.  Mr. Barboza will be set up with an MRI of his thoracic spine.  He is to follow-up in this clinic after the MRI thoracic spine.        Total Time on encounter:  30 minutes were spent on one more or more of the following:  discussion with patient, history, exam, coordinating care, treatment goals, record review, documenting clinical information, and/or data review.      Liam Mayen MD

## 2022-05-23 NOTE — LETTER
5/23/2022         RE: All Barboza  [address]  [place] [address]        Dear Colleague,    Thank you for referring your patient, All Barboza, to the [facility]. Please see a copy of my visit note below.    PHYSICAL MEDICINE & REHABILITATION / MEDICAL SPINE        Date:  May 23, 2022    Name:  All Barboza  YOB: 1990  MRN:  [id number]          CHART REVIEW:  Reviewed MyChart notes between Bj Correia MD (family medicine) and Mr. All aBrboza dated 03/30/2022.  Mr. All Barboza had been having back and knee issues since he was in college.  He is expecting to have a baby in October 2022.  Mr. All Barboza requested referrals for his back pain and knee pain.        CHIEF COMPLAINT:  Mid/low back pain.        HISTORY OF PRESENT ILLNESS:  Mr. All Barboza is a 31-year-old right-hand-dominant male.  He owns a finish carpentry company.     On 05/01/2022, Mr. All Barboza was working on installing a deck at his house.  He fell off the deck roughly 5 feet landing on his left wrist and forearm.  Mr. All Barboza denied any other injuries of his head, neck, upper back, shoulders, arms, elbows, forearms, wrists, hands, fingers.    Mr. All Barboza denied any personal or family history of autoimmune diseases, rheumatologic diseases, gout, pseudogout.  He denied any personal family history of neurologic diseases.  Mr. Barboza denied any personal or family history of inflammatory bowel diseases.    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  Mr. All Barboza would have to hammer these tubes into the ground.  He states 1 time when he was pulling the tube back out again he had a catch in his mid back and has had pain since that time.  Mr. Barboza has had a right knee knee injury and is being seen in sports medicine for that.  Mr. Barboza denies any other injuries of his upper back, mid  "back, low back, pelvis, hips, thighs, knees, legs, ankles, feet, toes.    Mr. All Barboza has intermittent pain in his thoracic back between T8 and T10.  This pain is described as \"sharp.\"  Pain is occurring every 1 to 2 months.  When it develops, there are back spasms that radiate around the rib cage.  Episodes last 1 to 1-1/2 days.  There is no radiation of this pain proximally or distally.  Mr. All Barboza has been seeing a chiropractor once per week.  He finds that these treatments help.  Episodes of back spasms now occur proximately once per 6 months.  These episodes still last 1 to 1-1/2 days.  Mr. Barboza has not tried physical therapy, acupuncture, injections, massage.    Mr. All Barboza denies any weakness.  He denies any give way episodes of the lower extremities.  He denies any tripping, stumbling, falling.  Mr. Barboza denies any numbness, tingling, pins-and-needles.  He denies any saddle anesthesia, bowel incontinence, bladder incontinence.        REVIEW OF SYSTEMS:  Review of Systems     Constitutional:  Negative for fever, weight loss, weight gain and fatigue.   HENT:  Negative for tinnitus, trouble swallowing and hoarse voice.    Eyes:  Negative for pain, eye pain and decreased vision.   Respiratory:   Negative for cough, shortness of breath and wheezing.    Cardiovascular:  Positive for palpitations. Negative for chest pain and leg swelling.   Gastrointestinal:  Negative for heartburn, nausea, vomiting, abdominal pain, diarrhea, constipation, blood in stool and bowel incontinence.   Genitourinary:  Negative for bladder incontinence, dysuria, hematuria and difficulty urinating.   Musculoskeletal:  Positive for arthralgias. Negative for myalgias.   Skin:  Negative for itching, poor wound healing and poor wound healing.   Neurological:  Negative for dizziness, seizures, loss of consciousness, headaches and difficulty walking.   Endo/Heme:  Negative for anemia, swollen glands and " bruises/bleeds easily.   Psychiatric/Behavioral:  Positive for depression.          ALLERGIES:  No Known Allergies      MEDICATIONS:  Current Outpatient Medications   Medication Sig Dispense Refill     ADDERALL XR 30 MG 24 hr capsule        aluminum chloride (DRYSOL) 20 % external solution Apply topically At Bedtime 35 mL 11     amphetamine-dextroamphetamine (ADDERALL XR) 30 MG 24 hr capsule Take 1 capsule (30 mg) by mouth daily 30 capsule 0     [START ON 6/3/2022] amphetamine-dextroamphetamine (ADDERALL XR) 30 MG 24 hr capsule Take 1 capsule (30 mg) by mouth daily 30 capsule 0     [START ON 7/4/2022] amphetamine-dextroamphetamine (ADDERALL XR) 30 MG 24 hr capsule Take 1 capsule (30 mg) by mouth daily 30 capsule 0     busPIRone (BUSPAR) 15 MG tablet Take 1 tablet (15 mg) by mouth 2 times daily 180 tablet 3         PAST MEDICAL HISTORY:  Past Medical History:   Diagnosis Date     DDD (degenerative disc disease), thoracic 5/23/2022     Depressive disorder 2016    Mild         PAST SURGICAL HISTORY:  History reviewed. No pertinent surgical history.      FAMILY HISTORY:  Family History   Problem Relation Age of Onset     Breast Cancer Mother      Depression Mother          SOCIAL HISTORY:  Social History     Socioeconomic History     Marital status:      Spouse name: Not on file     Number of children: Not on file     Years of education: Not on file     Highest education level: Not on file   Occupational History     Not on file   Tobacco Use     Smoking status: Never Smoker     Smokeless tobacco: Never Used   Vaping Use     Vaping Use: Never used   Substance and Sexual Activity     Alcohol use: Yes     Alcohol/week: 0.0 standard drinks     Comment: social      Drug use: No     Sexual activity: Yes     Partners: Female     Birth control/protection: Natural Family Planning, None   Other Topics Concern     Parent/sibling w/ CABG, MI or angioplasty before 65F 55M? No   Social History Narrative     Not on file  "    Social Determinants of Health     Financial Resource Strain: Not on file   Food Insecurity: Not on file   Transportation Needs: Not on file   Physical Activity: Not on file   Stress: Not on file   Social Connections: Not on file   Intimate Partner Violence: Not on file   Housing Stability: Not on file         PHYSICAL EXAMINATION:  Vitals:    05/23/22 1511   BP: 124/75   Pulse: 92   SpO2: 98%   Weight: 101.6 kg (224 lb)   Height: 1.88 m (6' 2\")       GENERAL:  No acute distress.  Pleasant and cooperative.   PSYCH:  Normal mood and affect.  HEAD:  Normocephalic.  SPEECH:  No dysarthria.  EYES:  No scleral icterus.  EARS:  Hearing is intact to spoken voice.  NOSE/MOUTH:  Wearing a face mask.  LUNGS:  No respiratory distress.  No increased work of breathing.    BALANCE AND GAIT: The patient has reciprocal gait pattern without antalgia.  He is able to heel walk, toe walk, tandem walk without difficulty.  Double leg squat is performed with quadriceps dominant pattern.  Single-leg squat on the right has mild dynamic knee valgus.  Single-leg squat on the left has mild dynamic knee valgus.    INSPECTION:  There is no erythema, ecchymosis, deformity, asymmetry, or abnormality of the back.    THORACIC PALPATION:  Thoracic Spinous Processes: Not tender.  Thoracic Paraspinals:  Right not tender.  Left not tender.  Levator Scapulae at the Scapular Insertion:  Right not tender.  Left not tender.  Rhomboid Minor:  Right not tender.  Left not tender.  Rhomboid Major:  Right not tender.  Left not tender.    LUMBOPELVIC PALPATION:  Lumbar Spinous Processes:  not tender.  Lumbar Paraspinals:  Right not tender.  Left not tender.  Posterior Superior Iliac Spine:  Right not tender.  Left not tender.  Iliac Crest:  Right not tender.  Left not tender.  Sacroiliac Joint:  Right not tender.  Left not tender.  Hip External Rotators:  Right not tender.  Left not tender.  Ischial Tuberosity:  Right not tender.  Left not tender.  Greater " Trochanter:  Right not tender.  Left not tender.  Coccyx:  not tender.    THORACOLUMBAR RANGE OF MOTION:  Forward flexion (85 ): Normal range of motion, does not cause pain, does not cause radiating pain.  Extension (30 ): Normal range of motion, increases back pain from T8-T10, does not cause radiating pain.  Lateral bending right (30 ):  Normal range of motion, does not cause pain, does not cause radiating pain.  Lateral bending left (30 ):  Normal range of motion, does not cause pain, does not cause radiating pain.  Twisting right with extension:  Normal range of motion, does not cause pain, does not cause radiating pain.  Twisting left with extension:  Normal range of motion, does not cause pain, does not cause radiating pain.  Sacroiliac joint dysfunction:  Right -.  Left -.        IMAGING:  There is no thoracic spine imaging available.        ASSESSMENT/PLAN:  Mr. All Barboza is a 31-year-old, right-hand-dominant, male.  His thoracic back pain represents a chronic myofascial strain versus chronic thoracic degenerative disc disease.  Discussed diagnosis, pathophysiology, and treatment options with Mr. Barboza.  Discussed the options of doing nothing/living with, physical therapy, chiropractic care, oral medications such as gabapentin and pregabalin, thoracic epidural corticosteroid injection, referral to neurosurgery.  Mr. Barboza will be set up with an MRI of his thoracic spine.  He is to follow-up in this clinic after the MRI thoracic spine.        Total Time on encounter:  30 minutes were spent on one more or more of the following:  discussion with patient, history, exam, coordinating care, treatment goals, record review, documenting clinical information, and/or data review.      Liam Mayen MD

## 2022-05-23 NOTE — PATIENT INSTRUCTIONS
Please schedule an MRI.  The [facility] Imaging Scheduling team will call you to schedule your imaging exam in the next 0-3 days.  You can also call them directly at [facility] [phone] ([address], [address], [place], [place]) and [facility] [phone] ([address], [place], [place]) to schedule your appointment.      Please schedule a follow-up appointment after your MRI thoracic spine.  You can schedule this at the , or you can call [phone].

## 2022-05-25 ASSESSMENT — ENCOUNTER SYMPTOMS
WHEEZING: 0
HEARTBURN: 0
MYALGIAS: 0
NERVOUS/ANXIOUS: 1
FEVER: 0
DIARRHEA: 0
DIZZINESS: 0
PALPITATIONS: 1
ABDOMINAL PAIN: 0
BOWEL INCONTINENCE: 0
HEMATURIA: 0
SWOLLEN GLANDS: 0
HEADACHES: 0
BLOOD IN STOOL: 0
FATIGUE: 0
SHORTNESS OF BREATH: 0
CONSTIPATION: 0
LEG SWELLING: 0
NAUSEA: 0
SEIZURES: 0
ARTHRALGIAS: 1
EYE PAIN: 0
BRUISES/BLEEDS EASILY: 0
TROUBLE SWALLOWING: 0
INSOMNIA: 0
COUGH: 0
LOSS OF CONSCIOUSNESS: 0
DIFFICULTY URINATING: 0
VOMITING: 0
WEIGHT GAIN: 0
POOR WOUND HEALING: 0
DYSURIA: 0
WEIGHT LOSS: 0
DEPRESSION: 1
HOARSE VOICE: 0

## 2022-08-10 ENCOUNTER — OFFICE VISIT (OUTPATIENT)
Dept: INTERNAL MEDICINE | Facility: CLINIC | Age: 32
End: 2022-08-10
Payer: COMMERCIAL

## 2022-08-10 VITALS
DIASTOLIC BLOOD PRESSURE: 71 MMHG | WEIGHT: 220.9 LBS | OXYGEN SATURATION: 100 % | SYSTOLIC BLOOD PRESSURE: 111 MMHG | BODY MASS INDEX: 28.35 KG/M2 | RESPIRATION RATE: 18 BRPM | HEART RATE: 78 BPM | HEIGHT: 74 IN | TEMPERATURE: 98.9 F

## 2022-08-10 DIAGNOSIS — Z79.899 CONTROLLED SUBSTANCE AGREEMENT SIGNED: ICD-10-CM

## 2022-08-10 DIAGNOSIS — R00.2 PALPITATIONS: ICD-10-CM

## 2022-08-10 DIAGNOSIS — F98.8 ATTENTION DEFICIT DISORDER (ADD) WITHOUT HYPERACTIVITY: ICD-10-CM

## 2022-08-10 DIAGNOSIS — F32.A DEPRESSION, UNSPECIFIED DEPRESSION TYPE: ICD-10-CM

## 2022-08-10 DIAGNOSIS — F41.9 ANXIETY: Primary | ICD-10-CM

## 2022-08-10 LAB
AMPHETAMINES UR QL: DETECTED
BARBITURATES UR QL SCN: NOT DETECTED
BENZODIAZ UR QL SCN: NOT DETECTED
BUPRENORPHINE UR QL: NOT DETECTED
CANNABINOIDS UR QL: DETECTED
COCAINE UR QL SCN: NOT DETECTED
D-METHAMPHET UR QL: NOT DETECTED
METHADONE UR QL SCN: NOT DETECTED
OPIATES UR QL SCN: NOT DETECTED
OXYCODONE UR QL SCN: NOT DETECTED
PCP UR QL SCN: NOT DETECTED
PROPOXYPH UR QL: NOT DETECTED
TRICYCLICS UR QL SCN: NOT DETECTED

## 2022-08-10 PROCEDURE — 99214 OFFICE O/P EST MOD 30 MIN: CPT

## 2022-08-10 PROCEDURE — 96127 BRIEF EMOTIONAL/BEHAV ASSMT: CPT

## 2022-08-10 PROCEDURE — 80306 DRUG TEST PRSMV INSTRMNT: CPT

## 2022-08-10 RX ORDER — DEXTROAMPHETAMINE SULFATE, DEXTROAMPHETAMINE SACCHARATE, AMPHETAMINE SULFATE AND AMPHETAMINE ASPARTATE 7.5; 7.5; 7.5; 7.5 MG/1; MG/1; MG/1; MG/1
30 CAPSULE, EXTENDED RELEASE ORAL DAILY
Qty: 30 CAPSULE | Refills: 0 | Status: SHIPPED | OUTPATIENT
Start: 2022-08-10 | End: 2022-09-12

## 2022-08-10 RX ORDER — BUSPIRONE HYDROCHLORIDE 10 MG/1
10 TABLET ORAL EVERY EVENING
Qty: 30 TABLET | Refills: 3 | Status: SHIPPED | OUTPATIENT
Start: 2022-08-10 | End: 2022-09-08

## 2022-08-10 ASSESSMENT — ANXIETY QUESTIONNAIRES
3. WORRYING TOO MUCH ABOUT DIFFERENT THINGS: MORE THAN HALF THE DAYS
GAD7 TOTAL SCORE: 11
2. NOT BEING ABLE TO STOP OR CONTROL WORRYING: MORE THAN HALF THE DAYS
6. BECOMING EASILY ANNOYED OR IRRITABLE: SEVERAL DAYS
IF YOU CHECKED OFF ANY PROBLEMS ON THIS QUESTIONNAIRE, HOW DIFFICULT HAVE THESE PROBLEMS MADE IT FOR YOU TO DO YOUR WORK, TAKE CARE OF THINGS AT HOME, OR GET ALONG WITH OTHER PEOPLE: SOMEWHAT DIFFICULT
7. FEELING AFRAID AS IF SOMETHING AWFUL MIGHT HAPPEN: SEVERAL DAYS
1. FEELING NERVOUS, ANXIOUS, OR ON EDGE: SEVERAL DAYS
GAD7 TOTAL SCORE: 11
4. TROUBLE RELAXING: MORE THAN HALF THE DAYS
GAD7 TOTAL SCORE: 11
5. BEING SO RESTLESS THAT IT IS HARD TO SIT STILL: MORE THAN HALF THE DAYS
8. IF YOU CHECKED OFF ANY PROBLEMS, HOW DIFFICULT HAVE THESE MADE IT FOR YOU TO DO YOUR WORK, TAKE CARE OF THINGS AT HOME, OR GET ALONG WITH OTHER PEOPLE?: SOMEWHAT DIFFICULT
7. FEELING AFRAID AS IF SOMETHING AWFUL MIGHT HAPPEN: SEVERAL DAYS

## 2022-08-10 ASSESSMENT — PATIENT HEALTH QUESTIONNAIRE - PHQ9
SUM OF ALL RESPONSES TO PHQ QUESTIONS 1-9: 9
10. IF YOU CHECKED OFF ANY PROBLEMS, HOW DIFFICULT HAVE THESE PROBLEMS MADE IT FOR YOU TO DO YOUR WORK, TAKE CARE OF THINGS AT HOME, OR GET ALONG WITH OTHER PEOPLE: SOMEWHAT DIFFICULT
SUM OF ALL RESPONSES TO PHQ QUESTIONS 1-9: 9

## 2022-08-10 NOTE — LETTER
[facility]  08/10/22  Patient: All Barboza  YOB: 1990  Medical Record Number: [id number]                                                                                  Non-Opioid Controlled Substance Agreement    This is an agreement between you and your provider regarding safe and appropriate use of controlled substances prescribed by your care team. Controlled substances are?medicines that can cause physical and mental dependence (abuse).     There are strict laws about having and using these medicines. We here at [facility] are  committed to working with you in your efforts to get better. To support you in this work, we'll help you schedule regular office appointments for medicine refills. If we must cancel or change your appointment for any reason, we'll make sure you have enough medicine to last until your next appointment.     As a Provider, I will:     Listen carefully to your concerns while treating you with respect.     Recommend a treatment plan that I believe is in your best interest and may involve therapies other than medicine.      Talk with you often about the possible benefits and the risk of harm of any medicine that we prescribe for you.    Assess the safety of this medicine and check how well it works.      Provide a plan on how to taper (discontinue or go off) using this medicine if the decision is made to stop its use.      ::  As a Patient, I understand controlled substances:       Are prescribed by my care provider to help me function or work and manage my condition(s).?    Are strong medicines and can cause serious side effects.       Need to be taken exactly as prescribed.?Combining controlled substances with certain medicines or chemicals (such as illegal drugs, alcohol, sedatives, sleeping pills, and benzodiazepines) can be dangerous or even fatal.? If I stop taking my medicines suddenly, I may have severe withdrawal symptoms.     The  risks, benefits, and side effects of these medicine(s) were explained to me. I agree that:    1. I will take part in other treatments as advised by my care team. This may be psychiatry or counseling, physical therapy, behavioral therapy, group treatment or a referral to specialist.    2. I will keep all my appointments and understand this is part of the monitoring of controlled substances.?My care team may require an office visit for EVERY controlled substance refill. If I miss appointments or don t follow instructions, my care team may stop my medicine    3. I will take my medicines as prescribed. I will not change the dose or schedule unless my care team tells me to. There will be no refills if I run out early.      4. I may be asked to come to the clinic and complete a urine drug test or complete a pill count. If I don t give a urine sample or participate in a pill count, the care team may stop my medicine.    5. I will only receive controlled substance prescriptions from this clinic. If I am treated by another provider, I will tell them that I am taking controlled substances and that I have a treatment agreement with this provider. I will inform my [facility] care team within one business day if I am given a prescription for any controlled substance by another healthcare provider. My [facility] care team can contact other providers and pharmacists about my use of any medicines.    6. It is up to me to make sure that I don't run out of my medicines on weekends or holidays.?If my care team is willing to refill my prescription without a visit, I must request refills only during office hours. Refills may take up to 3 business days to process. I will use one pharmacy to fill all my controlled substance prescriptions. I will notify the clinic about any changes to my insurance or medicine availability.    7. I am responsible for my prescriptions. If the medicine/prescription is lost, stolen or destroyed,  it will not be replaced.?I also agree not to share controlled substance medicines with anyone.     8. I am aware I should not use any illegal or recreational drugs. I agree not to drink alcohol unless my care team says I can.     9. If I enroll in the [place] Medical Cannabis program, I will tell my care team before my next refill.    10. I will tell my care team right away if I become pregnant, have a new medical problem treated outside of my regular clinic, or have a change in my medicines.     11. I understand that this medicine can affect my thinking, judgment and reaction time.? Alcohol and drugs affect the brain and body, which can affect the safety of my driving. Being under the influence of alcohol or drugs can affect my decision-making, behaviors, personal safety and the safety of others. Driving while impaired (DWI) can occur if a person is driving, operating or in physical control of a car, motorcycle, boat, snowmobile, ATV, motorbike, off-road vehicle or any other motor vehicle ([place] Statute 169A.20). I understand the risk if I choose to drive or operate any vehicle or machinery.    I understand that if I do not follow any of the conditions above, my prescriptions or treatment may be stopped or changed.   I agree that my provider, clinic care team and pharmacy may work with any city, state or federal law enforcement agency that investigates the misuse, sale or other diversion of my controlled medicine. I will allow my provider to discuss my care with, or share a copy of, this agreement with any other treating provider, pharmacy or emergency room where I receive care.     I have read this agreement and have asked questions about anything I did not understand.    ________________________________________________________  Patient Signature - All Barboza     ___________________                   Date     ________________________________________________________  Provider Signature - Billy Irving NP        ___________________                   Date     ________________________________________________________  Witness Signature (required if provider not present while patient signing)          ___________________                   Date

## 2022-08-10 NOTE — PROGRESS NOTES
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 Has started having anxiety attacks again which hasn't happened for several years. He also feels some lightheadedness when there is a long gap between doses. We agreed that taking a 10mg tablet in addition to 15mg in morning at night. We will try this and re-evaluate in 4-6 weeks  - busPIRone (BUSPAR) 10 MG tablet; Take 1 tablet (10 mg) by mouth every evening One tablets in morning, one in evening, one at night    Depression, unspecified depression type  Has taken selective serotonin reuptake inhibitor, and SNRI in past. Did not like the way they made him felt. He does not wish to start new medication at this time and will follow up with therapy.  - Adult Mental Health  Referral; Future    Attention deficit disorder (ADD) without hyperactivity  Has been taking since college will refill.  - Urine Drugs of Abuse Screen; Future  - ADDERALL XR 30 MG 24 hr capsule; Take 1 capsule (30 mg) by mouth daily  - Urine Drugs of Abuse Screen    Controlled substance agreement signed  For adderall none on file this year  - Adult Mental Health  Referral; Future  - Urine Drugs of Abuse Screen; Future  - Urine Drugs of Abuse Screen    Palpitations  Patient has occasional palpations a few times a week, no dizziness, or chest pain. Wanted to see cardiology regarding this at some point but I will order Holter for patient. Regular rate with no additional premature or abnormal beats with auscultation.  - Adult Leadless EKG Monitor 3 to 7 Days; Future      BMI:   Estimated body mass index is 28.36 kg/m  as calculated from the following:    Height as of this encounter: 1.88 m (6' 2\").    Weight as of this encounter: 100.2 kg (220 lb 14.4 oz).   Weight management plan: Discussed healthy diet and exercise guidelines    Return in about 6 years (around 8/10/2028) for in 4-6 weeks to " re-evaluate.    Billy Irving NP  [facility] HIPOLITO Carrizales is a 32 year old accompanied by his self, presenting for the following health issues:  Anxiety (Anxiety, Depression and ADD follow up.)    History of Present Illness       Mental Health Follow-up:  Patient presents to follow-up on Depression & Anxiety.Patient's depression since last visit has been:  Medium  The patient is not having other symptoms associated with depression.  Patient's anxiety since last visit has been:  Better  The patient is not having other symptoms associated with anxiety.  Any significant life events: other  Patient is feeling anxious or having panic attacks.  Patient has no concerns about alcohol or drug use.    Reason for visit:  ADD and anxiety medicine management    He eats 0-1 servings of fruits and vegetables daily.He consumes 4 sweetened beverage(s) daily.He exercises with enough effort to increase his heart rate 10 to 19 minutes per day.  He exercises with enough effort to increase his heart rate 4 days per week.   He is taking medications regularly.    Today's PHQ-9         PHQ-9 Total Score: 9    PHQ-9 Q9 Thoughts of better off dead/self-harm past 2 weeks :   Not at all    How difficult have these problems made it for you to do your work, take care of things at home, or get along with other people: Somewhat difficult  Today's YANNA-7 Score: 11       Review of Systems   Constitutional, HEENT, cardiovascular, pulmonary, GI, , musculoskeletal, neuro, skin, endocrine and psych systems are negative, except as otherwise noted.        For depression has tried Lexapro, Celexa, Wellbutrin and is now on buspirone for anxiety. Did not like the way the other medication made him feel. With buspar he is having some lightheadedness if he forgets to take medication, usually in morning. The lightheaded feeling goes away after he takes his medication Is having increase anxiety now due to stress from job.  "Would also like to see therapy.    Right before leaving patient mentioned he also has palpations a few times a week that go away after a few seconds and would like to get them checked out. He is not dizzy when they happen, no CP or SOB, denies presyncope. Notices them more while drinking caffeine or taking adderall. He has had them his whole life but thinks they may be getting more frequent.    Objective    /71 (BP Location: Right arm, Patient Position: Sitting, Cuff Size: Adult Large)   Pulse 78   Temp 98.9  F (37.2  C) (Tympanic)   Resp 18   Ht 1.88 m (6' 2\")   Wt 100.2 kg (220 lb 14.4 oz)   SpO2 100%   BMI 28.36 kg/m    Body mass index is 28.36 kg/m .  Physical Exam   GENERAL: alert and no distress  EYES: Eyes grossly normal to inspection   NECK:  no asymmetry  RESP: lungs clear to auscultation - no rales, rhonchi or wheezes  CV: regular rate and rhythm, normal S1 S2, no S3 or S4, no murmur, click or rub, no peripheral edema and peripheral pulses strong  ABDOMEN: soft, nontender, no hepatosplenomegaly, no masses and bowel sounds normal  MS: no gross musculoskeletal defects noted, no edema  SKIN: no suspicious lesions or rashes  NEURO: Normal strength and tone, mentation intact and speech normal  PSYCH: mentation appears normal, affect normal/bright    "

## 2022-08-10 NOTE — PATIENT INSTRUCTIONS
Call insurance to see if you can get into a therapist covered by insurance is available soon     Buspar 15mg in morning, 10mg at noon, 15mg at night    I sent 10mg pills to pharmacy    Follow up with me or other provider to check how the buspar is going

## 2022-08-23 ENCOUNTER — MYC MEDICAL ADVICE (OUTPATIENT)
Dept: ORTHOPEDICS | Facility: CLINIC | Age: 32
End: 2022-08-23

## 2022-08-23 DIAGNOSIS — M54.12 CERVICAL RADICULOPATHY: ICD-10-CM

## 2022-08-23 DIAGNOSIS — M51.34 DDD (DEGENERATIVE DISC DISEASE), THORACIC: Primary | ICD-10-CM

## 2022-08-24 NOTE — TELEPHONE ENCOUNTER
To: All Barboza      From: Liam Muir MD      Created: 2022 8:29 AM        Mr. All NYE Jabari,     I reordered the MRIs of your cervical spine and thoracic spine.     Liam Muir MD            [facility]      From: All Barboza      Created: 2022 10:40 AM        *-*-*This message was handled on 2022 8:29 AM by LIAM MUIR K*-*-*    Liam -      Back in May, you scheduled me for an MRI for my back and neck.  I've been extremely busy this summer, and haven't had the time to schedule it and go to it, so the order .  The scheduling department suggested I reach out to you to see if you can put a new order through.  Let me know if you can do this.     Thanks,     Dash

## 2022-09-06 ENCOUNTER — HOSPITAL ENCOUNTER (OUTPATIENT)
Dept: MRI IMAGING | Facility: CLINIC | Age: 32
Discharge: HOME | End: 2022-09-06
Attending: PHYSICAL MEDICINE & REHABILITATION
Payer: COMMERCIAL

## 2022-09-06 DIAGNOSIS — M51.34 DDD (DEGENERATIVE DISC DISEASE), THORACIC: ICD-10-CM

## 2022-09-06 DIAGNOSIS — M54.12 CERVICAL RADICULOPATHY: ICD-10-CM

## 2022-09-06 DIAGNOSIS — F41.9 ANXIETY: ICD-10-CM

## 2022-09-06 PROCEDURE — 72141 MRI NECK SPINE W/O DYE: CPT

## 2022-09-06 PROCEDURE — 72146 MRI CHEST SPINE W/O DYE: CPT

## 2022-09-07 ENCOUNTER — TELEPHONE (OUTPATIENT)
Dept: ORTHOPEDICS | Facility: CLINIC | Age: 32
End: 2022-09-07

## 2022-09-08 RX ORDER — BUSPIRONE HYDROCHLORIDE 10 MG/1
10 TABLET ORAL EVERY EVENING
Qty: 30 TABLET | Refills: 3 | Status: SHIPPED | OUTPATIENT
Start: 2022-09-08

## 2022-09-08 NOTE — TELEPHONE ENCOUNTER
Routing refill request to provider for review/approval because:  Sig has two different instructions. Please confirm.     Marybel Roberts RN, BSN, PHN  [facility]: [place]

## 2022-09-08 NOTE — PROGRESS NOTES
ASSESSMENT & PLAN  Patient Instructions     1. Sprain of left wrist, initial encounter      -Patient has chronic left wrist pain after a fall on outstretched hand due to a sprain  -Patient originally had x-rays performed right after the injury which did not show any acute fracture or dislocation.  -Patient has been wearing the brace and resting which has helped but states over the past month or so his pain has plateaued and persistent with any weightbearing activity in flexion or extension  -Patient will start formal hand therapy and home exercise program  -Patient may try working out with his wrist in a more neutral position as his pain allows  -Patient may starts ibuprofen or Aleve as needed for pain and inflammation  -Patient will follow up after 6 weeks of therapy and home exercises for reevaluation if pain does not improve  -Call direct clinic number [[phone]] at any time with questions or concerns.    Albert Yeo MD [facility] Orthopedics and Sports Medicine            -----    SUBJECTIVE  All Barboza is a/an 32 year old Right handed male who is seen as a self referral for evaluation of left wrist pain. The patient is seen by themselves.    Onset: 4 month(s) ago. Patient describes injury as patient fell off of his deck while rebuilding it, landed on ulnar aspect of wrist  Location of Pain: left dorsal wrist/distal dorsal hand, non radiating   Rating of Pain at worst: 10/10  Rating of Pain Currently: 0/10 at rest, 6-7/10 with activity such as pushing  Worsened by: pushing through hand, wrist hyperextension   Better with: activity avoidance   Treatments tried: wrist brace (no longer wearing), previous imaging (xray left wrist and left hand performed at [facility] on 5/1/22)  Associated symptoms: no distal numbness or tingling; denies swelling or warmth  Orthopedic history: NO  Relevant surgical history: NO  Social history: social history: works as construction company owner  "    Past Medical History:   Diagnosis Date     DDD (degenerative disc disease), thoracic 5/23/2022     Depressive disorder 2016    Mild     Social History     Socioeconomic History     Marital status:    Tobacco Use     Smoking status: Never Smoker     Smokeless tobacco: Never Used   Vaping Use     Vaping Use: Never used   Substance and Sexual Activity     Alcohol use: Yes     Alcohol/week: 0.0 standard drinks     Comment: social      Drug use: No     Sexual activity: Yes     Partners: Female     Birth control/protection: Natural Family Planning, None   Other Topics Concern     Parent/sibling w/ CABG, MI or angioplasty before 65F 55M? No         Patient's past medical, surgical, social, and family histories were reviewed today and no changes are noted.and no changes are noted.    REVIEW OF SYSTEMS:  10 point ROS is negative other than symptoms noted above in HPI, Past Medical History or as stated below  Constitutional: NEGATIVE for fever, chills, change in weight  Skin: NEGATIVE for worrisome rashes, moles or lesions  GI/: NEGATIVE for bowel or bladder changes  Neuro: NEGATIVE for weakness, dizziness or paresthesias    OBJECTIVE:  /64   Ht 1.88 m (6' 2\")   Wt 103.4 kg (228 lb)   BMI 29.27 kg/m     General: healthy, alert and in no distress  HEENT: no scleral icterus or conjunctival erythema  Skin: no suspicious lesions or rash. No jaundice.  CV: regular rhythm by palpation  Resp: normal respiratory effort without conversational dyspnea   Psych: normal mood and affect  Gait: normal steady gait with appropriate coordination and balance  Neuro: Normal sensory exam of bilateral hands. Normal 2 pt discrimination.   MSK:  LEFT WRIST  Inspection:    No swelling or obvious deformity or asymmetry  Palpation:    Tender about the distal radius. Remainder of bony and ligamentous line marks are nontender.     Metacarpals: normal    Thumb: normal    Fingers: normal  Range of Motion:    pain at extremes of " flexion and extension  Strength:    extension limited by pain. Normal pinch strength.  Special Tests:    Positive: None        Independent visualization of the below image:  No results found for this or any previous visit (from the past 24 hour(s)).      EXAM: XR WRIST 3 VIEWS LEFT performed at [facility]  LOCATION: [facility]   DATE/TIME: 5/1/2022 8:34 PM     INDICATION: HAND PAIN/PROBLEM FALL   COMPARISON: None.     IMPRESSION:   Normal joint spaces and alignment. No fracture.    EXAM: XR HAND 3 VIEWS LEFT   LOCATION: [facility]   DATE/TIME: 5/1/2022 8:30 PM     INDICATION: HAND PAIN/PROBLEM FALL   COMPARISON: None.     IMPRESSION:   Normal joint spaces and alignment. No fracture.          Albert Yeo MD [facility]

## 2022-09-12 ENCOUNTER — OFFICE VISIT (OUTPATIENT)
Dept: ORTHOPEDICS | Facility: CLINIC | Age: 32
End: 2022-09-12
Payer: COMMERCIAL

## 2022-09-12 ENCOUNTER — MYC REFILL (OUTPATIENT)
Dept: INTERNAL MEDICINE | Facility: CLINIC | Age: 32
End: 2022-09-12

## 2022-09-12 VITALS
DIASTOLIC BLOOD PRESSURE: 64 MMHG | SYSTOLIC BLOOD PRESSURE: 104 MMHG | HEIGHT: 74 IN | BODY MASS INDEX: 29.26 KG/M2 | WEIGHT: 228 LBS

## 2022-09-12 DIAGNOSIS — S63.502A SPRAIN OF LEFT WRIST, INITIAL ENCOUNTER: Primary | ICD-10-CM

## 2022-09-12 DIAGNOSIS — F98.8 ATTENTION DEFICIT DISORDER (ADD) WITHOUT HYPERACTIVITY: ICD-10-CM

## 2022-09-12 PROCEDURE — 99203 OFFICE O/P NEW LOW 30 MIN: CPT | Performed by: FAMILY MEDICINE

## 2022-09-12 NOTE — LETTER
9/12/2022         RE: All Barboza  [address]  [place] [address]        Dear Colleague,    Thank you for referring your patient, All Barboza, to the [facility] [place]. Please see a copy of my visit note below.    ASSESSMENT & PLAN  Patient Instructions     1. Sprain of left wrist, initial encounter      -Patient has chronic left wrist pain after a fall on outstretched hand due to a sprain  -Patient originally had x-rays performed right after the injury which did not show any acute fracture or dislocation.  -Patient has been wearing the brace and resting which has helped but states over the past month or so his pain has plateaued and persistent with any weightbearing activity in flexion or extension  -Patient will start formal hand therapy and home exercise program  -Patient may try working out with his wrist in a more neutral position as his pain allows  -Patient may starts ibuprofen or Aleve as needed for pain and inflammation  -Patient will follow up after 6 weeks of therapy and home exercises for reevaluation if pain does not improve  -Call direct clinic number [[phone]] at any time with questions or concerns.    Albert Yeo MD [facility] Orthopedics and Sports Medicine  [facility]          -----    SUBJECTIVE  All Barboza is a/an 32 year old Right handed male who is seen as a self referral for evaluation of left wrist pain. The patient is seen by themselves.    Onset: 4 month(s) ago. Patient describes injury as patient fell off of his deck while rebuilding it, landed on ulnar aspect of wrist  Location of Pain: left dorsal wrist/distal dorsal hand, non radiating   Rating of Pain at worst: 10/10  Rating of Pain Currently: 0/10 at rest, 6-7/10 with activity such as pushing  Worsened by: pushing through hand, wrist hyperextension   Better with: activity avoidance   Treatments tried: wrist brace (no longer wearing), previous imaging  Patient is a chronic CO2 retainer in setting of obesity hypoventilation syndrome 2/2 morbid obesity. Stepped down from ICU 10/26, currently continuing diuresis with alternating BiPAP/NC breaks for meals.     Plan:   - Target SpO2 of >88%. Continue BiPAP throughout day with large breaks (2-3 hours) for meals. Wean FiO2 as tolerated.   - VBG this AM with pH 7.34, pCO2 98    - Continue Lasix gtt at 10mg/hr   - Diuril 500mg today   - Approx 42L net negative since admission   - Risk factor reduction (encouraged weight loss, use of nightly CPAP consistently)   "(xray left wrist and left hand performed at [facility] on 5/1/22)  Associated symptoms: no distal numbness or tingling; denies swelling or warmth  Orthopedic history: NO  Relevant surgical history: NO  Social history: social history: works as construction company owner     Past Medical History:   Diagnosis Date     DDD (degenerative disc disease), thoracic 5/23/2022     Depressive disorder 2016    Mild     Social History     Socioeconomic History     Marital status:    Tobacco Use     Smoking status: Never Smoker     Smokeless tobacco: Never Used   Vaping Use     Vaping Use: Never used   Substance and Sexual Activity     Alcohol use: Yes     Alcohol/week: 0.0 standard drinks     Comment: social      Drug use: No     Sexual activity: Yes     Partners: Female     Birth control/protection: Natural Family Planning, None   Other Topics Concern     Parent/sibling w/ CABG, MI or angioplasty before 65F 55M? No         Patient's past medical, surgical, social, and family histories were reviewed today and no changes are noted.and no changes are noted.    REVIEW OF SYSTEMS:  10 point ROS is negative other than symptoms noted above in HPI, Past Medical History or as stated below  Constitutional: NEGATIVE for fever, chills, change in weight  Skin: NEGATIVE for worrisome rashes, moles or lesions  GI/: NEGATIVE for bowel or bladder changes  Neuro: NEGATIVE for weakness, dizziness or paresthesias    OBJECTIVE:  /64   Ht 1.88 m (6' 2\")   Wt 103.4 kg (228 lb)   BMI 29.27 kg/m     General: healthy, alert and in no distress  HEENT: no scleral icterus or conjunctival erythema  Skin: no suspicious lesions or rash. No jaundice.  CV: regular rhythm by palpation  Resp: normal respiratory effort without conversational dyspnea   Psych: normal mood and affect  Gait: normal steady gait with appropriate coordination and balance  Neuro: Normal sensory exam of bilateral hands. Normal 2 pt discrimination.   MSK:  LEFT " WRIST  Inspection:    No swelling or obvious deformity or asymmetry  Palpation:    Tender about the distal radius. Remainder of bony and ligamentous line marks are nontender.     Metacarpals: normal    Thumb: normal    Fingers: normal  Range of Motion:    pain at extremes of flexion and extension  Strength:    extension limited by pain. Normal pinch strength.  Special Tests:    Positive: None        Independent visualization of the below image:  No results found for this or any previous visit (from the past 24 hour(s)).      EXAM: XR WRIST 3 VIEWS LEFT performed at [facility]  LOCATION: [facility]   DATE/TIME: 5/1/2022 8:34 PM     INDICATION: HAND PAIN/PROBLEM FALL   COMPARISON: None.     IMPRESSION:   Normal joint spaces and alignment. No fracture.    EXAM: XR HAND 3 VIEWS LEFT   LOCATION: [facility]   DATE/TIME: 5/1/2022 8:30 PM     INDICATION: HAND PAIN/PROBLEM FALL   COMPARISON: None.     IMPRESSION:   Normal joint spaces and alignment. No fracture.          Albert Yeo MD [facility] and Orthopedic Care        Again, thank you for allowing me to participate in the care of your patient.        Sincerely,        Albert Yeo, MD

## 2022-09-12 NOTE — PATIENT INSTRUCTIONS
1. Sprain of left wrist, initial encounter      -Patient has chronic left wrist pain after a fall on outstretched hand due to a sprain  -Patient originally had x-rays performed right after the injury which did not show any acute fracture or dislocation.  -Patient has been wearing the brace and resting which has helped but states over the past month or so his pain has plateaued and persistent with any weightbearing activity in flexion or extension  -Patient will start formal hand therapy and home exercise program  -Patient may try working out with his wrist in a more neutral position as his pain allows  -Patient may starts ibuprofen or Aleve as needed for pain and inflammation  -Patient will follow up after 6 weeks of therapy and home exercises for reevaluation if pain does not improve  -Call direct clinic number [[phone]] at any time with questions or concerns.    Albert Yeo MD [facility]  [facility] [place]

## 2022-09-13 RX ORDER — DEXTROAMPHETAMINE SULFATE, DEXTROAMPHETAMINE SACCHARATE, AMPHETAMINE SULFATE AND AMPHETAMINE ASPARTATE 7.5; 7.5; 7.5; 7.5 MG/1; MG/1; MG/1; MG/1
30 CAPSULE, EXTENDED RELEASE ORAL DAILY
Qty: 30 CAPSULE | Refills: 0 | Status: SHIPPED | OUTPATIENT
Start: 2022-09-13

## 2022-09-13 NOTE — TELEPHONE ENCOUNTER
Patient's home/cell number message on machine to call back.  (Due for follow up per Billy's last office notes.)

## 2022-09-13 NOTE — TELEPHONE ENCOUNTER
Prescription sent for September so he does not run out     Billy will have to decide if he will do more prescriptions for him to get 3 months prescription at a time

## 2022-09-20 RX ORDER — DEXTROAMPHETAMINE SACCHARATE, AMPHETAMINE ASPARTATE MONOHYDRATE, DEXTROAMPHETAMINE SULFATE AND AMPHETAMINE SULFATE 7.5; 7.5; 7.5; 7.5 MG/1; MG/1; MG/1; MG/1
30 CAPSULE, EXTENDED RELEASE ORAL DAILY
Qty: 30 CAPSULE | Refills: 0 | Status: SHIPPED | OUTPATIENT
Start: 2022-10-13

## 2022-09-22 NOTE — PROGRESS NOTES
Hand Therapy Initial Evaluation    Current Date:  9/23/2022    Diagnosis: Sprain of left wrist  DOI: 5/1/22  DOS: NA  Procedure:  NA  Post:  >3 mo    Precautions: None     Per MD 9/12/22  -Patient has chronic left wrist pain after a fall on outstretched hand due to a sprain  -Patient originally had x-rays performed right after the injury which did not show any acute fracture or dislocation.  -Patient has been wearing the brace and resting which has helped but states over the past month or so his pain has plateaued and persistent with any weightbearing activity in flexion or extension  -Patient will start formal hand therapy and home exercise program  -Patient may try working out with his wrist in a more neutral position as his pain allows  -Patient may starts ibuprofen or Aleve as needed for pain and inflammation  -Patient will follow up after 6 weeks of therapy and home exercises for reevaluation if pain does not improve    Subjective:  All Barboza is a 32 year old male.    Patient reports symptoms of the left wrist which occurred due to fall from porch. Since onset symptoms are Gradually getting better.      Answers for HPI/ROS submitted by the patient on 9/23/2022  Reason for Visit:: Wrist injury physical therapy  When problem began:: 5/1/2022  How problem occurred:: Fell off my deck when building it  Number scale: 3/10  General health as reported by patient: good  Please check all that apply to your current or past medical history: depression  Medical allergies: none  Surgeries: none  Medications you are currently taking: other  Other Meds Detail: Adderral for ADD and Buspirone for anxiety  Occupation:: Business owner (Construction company owner)  What are your primary job tasks: computer work, driving, lifting/carrying, prolonged sitting, prolonged standing, pushing/pulling, repetitive tasks    Occupational Profile Information:  Right hand dominant  Prior functional level:  no limitations  Patient reports  symptoms of pain, stiffness/loss of motion and weakness/loss of strength  Special tests:  x-ray.  (-)  Previous treatment: Brace wear for ~2 mo during day   Barriers include:none  Mobility: No difficulty  Transportation: drives  Currently working in normal job without restrictions  Leisure activities/hobbies: Hunting, fishing, Time with 1.6 YO child.   Other: Fell on ulnar side of hand and wrist. Prior pain in thumb - better now. Another baby on the way in 2 weeks.     Functional Outcome Measure:   Upper Extremity Functional Index Score:  SCORE:   Column Totals: /80: 75   (A lower score indicates greater disability.)    Objective:  Pain Level (Scale 0-10)   9/23/2022   At Rest 0   With Use 3-4     Pain Description  Date 9/23/2022   Location L mid carpel    Pain Quality Nagging and Tender, sore   Frequency intermittent     Pain is worst  daytime   Exacerbated by  End range with resistance of flexion and extension, extension is worse. Pushing up on hands.    Relieved by otc medications and rest   Progression Getting better then plateaued for past 3 mo      Edema  Edema (Circumference measured in cm)   9/23/2022 9/23/2022    R L   DRC  17.2 17     Sensation   WNL throughout all nerve distributions; per patient report    ROM  Pain Report: - none  + mild    ++ moderate    +++ severe   Wrist 9/23/2022 9/23/2022   AROM (PROM) R L   Extension 85 80   Flexion 80 85   RD 30 25 + Radial thumb   UD 50 35   Supination 65 70   Pronation 90 90     Tenderness: Pain level report on scale 0-10/10  WRIST PALPATION:  Date 9/23/2022   Side L    Ulna Styloid -   TFCC -   Distal Radius -   Radial Styloid -   DRUJ +   Volar Scaphoid -   Dorsal Scaphoid -   Volar Lunate +   Dorsal Lunate ++     Central Dorsal Zone: (+ for hypermobile or - for hypomobile) Pain 0-10/10   9/23/2022   SIDE L   Finger Extension Test (SL--in wrist flex, resist long ext) +   Carter Test (SL) -   Linscheid Test (CMC joints--stabilize distal MC, mob CMC) NT    Ballottement Scaphoid on Lunate -     Ulnar Dorsal Zone: (+ for hypermobile or - for hypomobile) Pain 0-10/10   9/23/2022   SIDE L   Radiocarpal P/S (TFCC--stab f/a, rotate with some compression) -   Ballottement II P/S (TFCC--stab hand/wrist, pt p/s) Sup = pain in radial wrist and thumb    TFCC load Test (UD, axially load wrist c volar/dorsal mvmt) NT   UMTDG test (TFCC--approximate the pisotriquetral and ulna) NT   Lunotriquetral Sheer Test NT   Piano Key Sign (DRUJ) -   Piano Key Test (DRUJ--distal ulna moved in pro/sup) -   Ballottement I: E/F (DRUJ--stabilize hand/wrist, pt e/f of elbow) NT   Volar RU Ligament Shift (DRUJ--stab ulna and wrist, push radius volarly) -   Dorsal RU Ligament Shift (DRUJ--stab ulna and wrist, push radius dorsally) -     SOC  Finklestein's -    Thumb ext resistance +   Thumb abd resistance -     Strength   (Measured in pounds)  Pain Report: - none  + mild    ++ moderate    +++ severe    9/23/2022 9/23/2022   Trials R L   1  2  3 120 115   Average       Lat Pinch 9/23/2022 9/23/2022   Trials R L   1  2  3 23 18.5   Average       3 Pt Pinch 9/23/2022 9/23/2022   Trials R L   1  2  3 25 22.5 + radial thumb    Average       Assessment:  Patient presents with symptoms consistent with diagnosis noted above,  with conservative intervention.     Patient's limitations or Problem List includes:  Pain, Decreased ROM/motion, Weakness, Hypomobility, Decreased  and Decreased pinch of the left wrist which interferes with the patient's ability to perform Work Tasks, Recreational Activities and Household Chores as compared to previous level of function.    Rehab Potential:  Excellent - Return to full activity, no limitations    Patient will benefit from skilled Occupational Therapy to increase ROM, flexibility, motion, overall strength,  strength, pinch strength, stability of wrist and stability of thumb and decrease pain and edema to return to previous activity level and resume normal  daily tasks and to reach their rehab potential.    Barriers to Learning:  No barrier    Communication Issues:  Patient appears to be able to clearly communicate and understand verbal and written communication and follow directions correctly.    Chart Review: Chart Review and Simple history review with patient    Identified Performance Deficits: functional mobility, meal preparation and cleanup, shopping, work and leisure activities    Assessment of Occupational Performance:  5 or more Performance Deficits    Clinical Decision Making (Complexity): Low complexity    Treatment Explanation:  The following has been discussed with the patient:  RX ordered/plan of care  Anticipated outcomes  Possible risks and side effects    Plan:  Frequency:  1 X week, once daily  Duration:  for 8 weeks    Treatment Plan:   Modalities:  US, Iontophoresis, Fluidotherapy and Paraffin  Therapeutic Exercise:  AROM, AAROM, PROM, Tendon Gliding, Isotonics, Isometrics and Stabilization  Neuromuscular re-education:  Nerve Gliding, Coordination/Dexterity, Posture, Kinesiotaping, Isometrics and Stabilization  Manual Techniques:  Coordination/Dexterity, Joint mobilization, Friction massage, Myofascial release and Manual edema mobilization  Orthotic Fabrication:  Static, Static progressive and Dynamic  Self Care:  Ergonomic Considerations and Work Tasks  Discharge Plan:  Achieve all LTG.  Independent in home treatment program.  Reach maximal therapeutic benefit.    Home Exercise Program:  L wrist and thumb taping as needed   L wrist OTC brace at night and during day as needed   Wrist AROM  Ice and heat as needed  Isometric gripping   Volar scaphid button provided for home OTC brace if helpful     Next Visit:  Assess HEP  MFR  Assess OTC brace with button at night  Wrist stability?   Pinch strength   Thumb AROM

## 2022-09-23 ENCOUNTER — THERAPY VISIT (OUTPATIENT)
Dept: OCCUPATIONAL THERAPY | Facility: CLINIC | Age: 32
End: 2022-09-23
Attending: FAMILY MEDICINE
Payer: COMMERCIAL

## 2022-09-23 DIAGNOSIS — S63.502A SPRAIN OF LEFT WRIST, INITIAL ENCOUNTER: ICD-10-CM

## 2022-09-23 DIAGNOSIS — M25.532 LEFT WRIST PAIN: ICD-10-CM

## 2022-09-23 PROCEDURE — 97165 OT EVAL LOW COMPLEX 30 MIN: CPT | Mod: GO | Performed by: OCCUPATIONAL THERAPIST

## 2022-09-23 PROCEDURE — 97110 THERAPEUTIC EXERCISES: CPT | Mod: GO | Performed by: OCCUPATIONAL THERAPIST

## 2022-10-04 ENCOUNTER — OFFICE VISIT (OUTPATIENT)
Dept: ORTHOPEDICS | Facility: CLINIC | Age: 32
End: 2022-10-04
Payer: COMMERCIAL

## 2022-10-04 VITALS
DIASTOLIC BLOOD PRESSURE: 67 MMHG | WEIGHT: 228 LBS | HEART RATE: 56 BPM | HEIGHT: 74 IN | BODY MASS INDEX: 29.26 KG/M2 | OXYGEN SATURATION: 99 % | SYSTOLIC BLOOD PRESSURE: 112 MMHG

## 2022-10-04 DIAGNOSIS — S29.019D THORACIC MYOFASCIAL STRAIN, SUBSEQUENT ENCOUNTER: Primary | ICD-10-CM

## 2022-10-04 PROCEDURE — 99213 OFFICE O/P EST LOW 20 MIN: CPT | Performed by: PHYSICAL MEDICINE & REHABILITATION

## 2022-10-04 ASSESSMENT — PAIN SCALES - GENERAL: PAINLEVEL: MILD PAIN (2)

## 2022-10-04 NOTE — PROGRESS NOTES
PHYSICAL MEDICINE & REHABILITATION / MEDICAL SPINE        Date:  Oct 4, 2022    Name:  All Barboza  YOB: 1990  MRN:  [id number]          CHART REVIEW:  Reviewed MyChart notes between Bj Correia MD (family medicine) and Mr. All Barboza dated 03/30/2022.  Mr. All Barboza had been having back and knee issues since he was in college.  He is expecting to have a baby in October 2022.  Mr. All Barboza requested referrals for his back pain and knee pain.        CHIEF COMPLAINT:  Mid back pain and neck pain.        HISTORY OF PRESENT ILLNESS:  Mr. All Barboza is a 32-year-old right-hand-dominant male.  He owns a finish carpentry company.     On 05/01/2022, Mr. All Barboza was working on installing a deck at his house.  He fell off the deck roughly 5 feet landing on his left wrist and forearm.  Mr. All Barboza denied any other injuries of his head, neck, upper back, shoulders, arms, elbows, forearms, wrists, hands, fingers.     Mr. All Barboza denied any personal or family history of autoimmune diseases, rheumatologic diseases, gout, pseudogout.  He denied any personal family history of neurologic diseases.  Mr. Barboza denied any personal or family history of inflammatory bowel diseases.     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  Mr. lAl Barboza would have to hammer these tubes into the ground.  He states 1 time when he was pulling the tube back out again he had a catch in his mid back and had pain since that time.  Mr. Barboza had a right knee knee injury for which he saw sports medicine.  Mr. Barboza denied any other injuries of his upper back, mid back, low back, pelvis, hips, thighs, knees, legs, ankles, feet, toes.    Mr. All Barboza was last seen in clinic in 05/23/2022.  He returns to clinic today 10/04/2022.    Mr. All Barboza has been working with a chiropractor weekly.  He is noting  improvement in his neck pain and in his mid back pain.  Mid back pain is currently rated as 2/10.  Neck pain is currently rated as 3/10.  Pain varies from 0/10 to 10/10.  Pain develops and worsens with lifting heavy things improperly.  Mr. All Barboza really has not had any mid back muscle spasms recently.  He is noting that overall his mid back and his neck are feeling better.  He is not taking any pain medications.    Mr. All Barboza is no longer noticing any pinching in his neck.  He does note a little bit of popping in his neck, but there is no pain with this.  He is no longer noticing any numbness, tingling, pins-and-needles.        ALLERGIES:  No Known Allergies      MEDICATIONS:  Current Outpatient Medications   Medication Sig Dispense Refill     ADDERALL XR 30 MG 24 hr capsule Take 1 capsule (30 mg) by mouth daily (Patient not taking: Reported on 10/4/2022) 30 capsule 0     aluminum chloride (DRYSOL) 20 % external solution Apply topically At Bedtime 35 mL 11     [START ON 10/13/2022] amphetamine-dextroamphetamine (ADDERALL XR) 30 MG 24 hr capsule Take 1 capsule (30 mg) by mouth daily (Patient not taking: Reported on 10/4/2022) 30 capsule 0     busPIRone (BUSPAR) 10 MG tablet Take 1 tablet (10 mg) by mouth every evening One tablets in morning, one in evening, one at night (Patient not taking: Reported on 10/4/2022) 30 tablet 3         PAST MEDICAL HISTORY:  Past Medical History:   Diagnosis Date     DDD (degenerative disc disease), thoracic 5/23/2022     Depressive disorder 2016    Mild         PAST SURGICAL HISTORY:  History reviewed. No pertinent surgical history.      FAMILY HISTORY:  Family History   Problem Relation Age of Onset     Breast Cancer Mother      Depression Mother      Atrial fibrillation Father          SOCIAL HISTORY:  Social History     Socioeconomic History     Marital status:      Spouse name: Not on file     Number of children: Not on file     Years of education: Not on  "file     Highest education level: Not on file   Occupational History     Not on file   Tobacco Use     Smoking status: Never Smoker     Smokeless tobacco: Never Used   Vaping Use     Vaping Use: Never used   Substance and Sexual Activity     Alcohol use: Yes     Alcohol/week: 0.0 standard drinks     Comment: social      Drug use: No     Sexual activity: Yes     Partners: Female     Birth control/protection: Natural Family Planning, None   Other Topics Concern     Parent/sibling w/ CABG, MI or angioplasty before 65F 55M? No   Social History Narrative     Not on file     Social Determinants of Health     Financial Resource Strain: Not on file   Food Insecurity: Not on file   Transportation Needs: Not on file   Physical Activity: Not on file   Stress: Not on file   Social Connections: Not on file   Intimate Partner Violence: Not on file   Housing Stability: Not on file         PHYSICAL EXAMINATION:  Vitals:    10/04/22 0851   BP: 112/67   Pulse: 56   SpO2: 99%   Weight: 103.4 kg (228 lb)   Height: 1.88 m (6' 2\")       GENERAL:  No acute distress.  Pleasant and cooperative.   PSYCH:  Normal mood and affect.  HEAD: Wearing a baseball cap.  SPEECH:  No dysarthria.  EYES:  No scleral icterus.  EARS:  Hearing is intact to spoken voice.  NOSE/MOUTH:  Wearing a face mask.  LUNGS:  No respiratory distress.  No increased work of breathing.        IMAGING:  EXAM: MR CERVICAL SPINE W/O CONTRAST, MR THORACIC SPINE W/O CONTRAST  LOCATION: [facility]  DATE/TIME: 9/6/2022 4:58 PM     INDICATION: Cervical radiculopathy  COMPARISON: None.  TECHNIQUE:   1) Routine Cervical Spine MRI without and with IV contrast.  2) Routine Thoracic Spine MRI without and with IV contrast.     FINDINGS:  CERVICAL SPINE:   Alignment: Straightening of the usual cervical lordosis.   Vertebral height: Normal.   Marrow signal: Normal.  Spinal cord: No abnormal signal.  Extraspinal: No extraspinal abnormality.      Craniovertebral " junction: Normal.     C1-2: Normal     C2-3: Normal height of disc. Mild disc desiccation. No disc herniation. No uncovertebral hypertrophy. Normal facet joints. No central spinal stenosis, no right foramen stenosis, no left foramen stenosis.     C3-4: Normal height of disc. Mild disc desiccation. No disc herniation. No uncovertebral hypertrophy. Normal facet joints. No central spinal stenosis, no right foramen stenosis, no left foramen stenosis.     C4-5:  Normal height of disc. Mild disc desiccation. No disc herniation. No uncovertebral hypertrophy. Normal facet joints. No central spinal stenosis, no right foramen stenosis, no left foramen stenosis.     C5-6: Normal height of disc. Mild disc desiccation. No disc herniation. No uncovertebral hypertrophy. Normal facet joints. No central spinal stenosis, no right foramen stenosis, no left foramen stenosis.     C6-7: Normal height of disc. Mild disc desiccation. No disc herniation. No uncovertebral hypertrophy. Normal facet joints. No central spinal stenosis, no right foramen stenosis, no left foramen stenosis.     C7-T1: Normal height of disc. Mild disc desiccation. No disc herniation. No uncovertebral hypertrophy. Normal facet joints. No central spinal stenosis, no right foramen stenosis, no left foramen stenosis.     THORACIC SPINE:  Alignment: Normal.   Surgical changes: None visualized.  Vertebral height: No acute fracture.   Marrow signal: Normal.  Spinal cord: No abnormal signal.  Extraspinal: No extraspinal abnormality.      Discs and Spinal Canal: Normal disc heights. No herniation. Normal facets. No spinal canal or neural foraminal stenosis. No abnormal cord signal.    IMPRESSION:  CERVICAL SPINE MRI:  1.  Mild/early degenerative disc signal without disc herniation. No high-grade central or foraminal stenosis.     THORACIC SPINE MRI:  1.  Normal MRI.         ASSESSMENT/PLAN:  Mr. All Barboza is a 32-year-old, right-hand-dominant, male.  He has had  significant improvement in his neck and mid back pain.  Pain is most consistent with myofascial strains that leads to muscle spasms.  Discussed MRIs of the cervical and thoracic spine with Mr. Barboza.  Discussed the options of doing nothing/living with it, physical therapy, strengthening, trigger point injections.  Mr. Barboza will contact this clinic when he has some back muscle spasms again.  At that point, we will get Mr. Barboza scheduled for trigger point injections for the muscle spasms.  Otherwise, Mr. All Barboza will follow-up in this clinic as needed.        Total Time on encounter:  25 minutes were spent on one more or more of the following:  discussion with patient, history, exam, coordinating care, treatment goals, record review, documenting clinical information, and/or data review.      Liam Mayen MD

## 2022-10-04 NOTE — PATIENT INSTRUCTIONS
Please schedule a follow-up appointment as needed.  You can schedule this at the , or you can call [phone].

## 2022-10-04 NOTE — LETTER
10/4/2022         RE: All Barboza  [address] [address]  [place] [address]        Dear Colleague,    Thank you for referring your patient, All Barboza, to the [facility]. Please see a copy of my visit note below.    PHYSICAL MEDICINE & REHABILITATION / MEDICAL SPINE        Date:  Oct 4, 2022    Name:  All Barboza  YOB: 1990  MRN:  [id number]          CHART REVIEW:  Reviewed MyChart notes between Bj Correia MD (family medicine) and Mr. All Barboza dated 03/30/2022.  Mr. All Barboza had been having back and knee issues since he was in college.  He is expecting to have a baby in October 2022.  Mr. All Barboza requested referrals for his back pain and knee pain.        CHIEF COMPLAINT:  Mid back pain and neck pain.        HISTORY OF PRESENT ILLNESS:  Mr. All Barboza is a 32-year-old right-hand-dominant male.  He owns a finish carpentry company.     On 05/01/2022, Mr. All Barboza was working on installing a deck at his house.  He fell off the deck roughly 5 feet landing on his left wrist and forearm.  Mr. All Barboza denied any other injuries of his head, neck, upper back, shoulders, arms, elbows, forearms, wrists, hands, fingers.     Mr. All Barboza denied any personal or family history of autoimmune diseases, rheumatologic diseases, gout, pseudogout.  He denied any personal family history of neurologic diseases.  Mr. Barboza denied any personal or family history of inflammatory bowel diseases.     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  Mr. All Barboza would have to hammer these tubes into the ground.  He states 1 time when he was pulling the tube back out again he had a catch in his mid back and had pain since that time.  Mr. Barboza had a right knee knee injury for which he saw sports medicine.  Mr. Barboza denied any other injuries of his upper back, mid back, low  back, pelvis, hips, thighs, knees, legs, ankles, feet, toes.    Mr. All Barboza was last seen in clinic in 05/23/2022.  He returns to clinic today 10/04/2022.    Mr. All Barboza has been working with a chiropractor weekly.  He is noting improvement in his neck pain and in his mid back pain.  Mid back pain is currently rated as 2/10.  Neck pain is currently rated as 3/10.  Pain varies from 0/10 to 10/10.  Pain develops and worsens with lifting heavy things improperly.  Mr. All Barboza really has not had any mid back muscle spasms recently.  He is noting that overall his mid back and his neck are feeling better.  He is not taking any pain medications.    Mr. All Barboza is no longer noticing any pinching in his neck.  He does note a little bit of popping in his neck, but there is no pain with this.  He is no longer noticing any numbness, tingling, pins-and-needles.        ALLERGIES:  No Known Allergies      MEDICATIONS:  Current Outpatient Medications   Medication Sig Dispense Refill     ADDERALL XR 30 MG 24 hr capsule Take 1 capsule (30 mg) by mouth daily (Patient not taking: Reported on 10/4/2022) 30 capsule 0     aluminum chloride (DRYSOL) 20 % external solution Apply topically At Bedtime 35 mL 11     [START ON 10/13/2022] amphetamine-dextroamphetamine (ADDERALL XR) 30 MG 24 hr capsule Take 1 capsule (30 mg) by mouth daily (Patient not taking: Reported on 10/4/2022) 30 capsule 0     busPIRone (BUSPAR) 10 MG tablet Take 1 tablet (10 mg) by mouth every evening One tablets in morning, one in evening, one at night (Patient not taking: Reported on 10/4/2022) 30 tablet 3         PAST MEDICAL HISTORY:  Past Medical History:   Diagnosis Date     DDD (degenerative disc disease), thoracic 5/23/2022     Depressive disorder 2016    Mild         PAST SURGICAL HISTORY:  History reviewed. No pertinent surgical history.      FAMILY HISTORY:  Family History   Problem Relation Age of Onset     Breast Cancer Mother   "    Depression Mother      Atrial fibrillation Father          SOCIAL HISTORY:  Social History     Socioeconomic History     Marital status:      Spouse name: Not on file     Number of children: Not on file     Years of education: Not on file     Highest education level: Not on file   Occupational History     Not on file   Tobacco Use     Smoking status: Never Smoker     Smokeless tobacco: Never Used   Vaping Use     Vaping Use: Never used   Substance and Sexual Activity     Alcohol use: Yes     Alcohol/week: 0.0 standard drinks     Comment: social      Drug use: No     Sexual activity: Yes     Partners: Female     Birth control/protection: Natural Family Planning, None   Other Topics Concern     Parent/sibling w/ CABG, MI or angioplasty before 65F 55M? No   Social History Narrative     Not on file     Social Determinants of Health     Financial Resource Strain: Not on file   Food Insecurity: Not on file   Transportation Needs: Not on file   Physical Activity: Not on file   Stress: Not on file   Social Connections: Not on file   Intimate Partner Violence: Not on file   Housing Stability: Not on file         PHYSICAL EXAMINATION:  Vitals:    10/04/22 0851   BP: 112/67   Pulse: 56   SpO2: 99%   Weight: 103.4 kg (228 lb)   Height: 1.88 m (6' 2\")       GENERAL:  No acute distress.  Pleasant and cooperative.   PSYCH:  Normal mood and affect.  HEAD: Wearing a baseball cap.  SPEECH:  No dysarthria.  EYES:  No scleral icterus.  EARS:  Hearing is intact to spoken voice.  NOSE/MOUTH:  Wearing a face mask.  LUNGS:  No respiratory distress.  No increased work of breathing.        IMAGING:  EXAM: MR CERVICAL SPINE W/O CONTRAST, MR THORACIC SPINE W/O CONTRAST  LOCATION: [facility]  DATE/TIME: 9/6/2022 4:58 PM     INDICATION: Cervical radiculopathy  COMPARISON: None.  TECHNIQUE:   1) Routine Cervical Spine MRI without and with IV contrast.  2) Routine Thoracic Spine MRI without and with IV " contrast.     FINDINGS:  CERVICAL SPINE:   Alignment: Straightening of the usual cervical lordosis.   Vertebral height: Normal.   Marrow signal: Normal.  Spinal cord: No abnormal signal.  Extraspinal: No extraspinal abnormality.      Craniovertebral junction: Normal.     C1-2: Normal     C2-3: Normal height of disc. Mild disc desiccation. No disc herniation. No uncovertebral hypertrophy. Normal facet joints. No central spinal stenosis, no right foramen stenosis, no left foramen stenosis.     C3-4: Normal height of disc. Mild disc desiccation. No disc herniation. No uncovertebral hypertrophy. Normal facet joints. No central spinal stenosis, no right foramen stenosis, no left foramen stenosis.     C4-5:  Normal height of disc. Mild disc desiccation. No disc herniation. No uncovertebral hypertrophy. Normal facet joints. No central spinal stenosis, no right foramen stenosis, no left foramen stenosis.     C5-6: Normal height of disc. Mild disc desiccation. No disc herniation. No uncovertebral hypertrophy. Normal facet joints. No central spinal stenosis, no right foramen stenosis, no left foramen stenosis.     C6-7: Normal height of disc. Mild disc desiccation. No disc herniation. No uncovertebral hypertrophy. Normal facet joints. No central spinal stenosis, no right foramen stenosis, no left foramen stenosis.     C7-T1: Normal height of disc. Mild disc desiccation. No disc herniation. No uncovertebral hypertrophy. Normal facet joints. No central spinal stenosis, no right foramen stenosis, no left foramen stenosis.     THORACIC SPINE:  Alignment: Normal.   Surgical changes: None visualized.  Vertebral height: No acute fracture.   Marrow signal: Normal.  Spinal cord: No abnormal signal.  Extraspinal: No extraspinal abnormality.      Discs and Spinal Canal: Normal disc heights. No herniation. Normal facets. No spinal canal or neural foraminal stenosis. No abnormal cord signal.    IMPRESSION:  CERVICAL SPINE MRI:  1.   Mild/early degenerative disc signal without disc herniation. No high-grade central or foraminal stenosis.     THORACIC SPINE MRI:  1.  Normal MRI.         ASSESSMENT/PLAN:  Mr. All Barboza is a 32-year-old, right-hand-dominant, male.  He has had significant improvement in his neck and mid back pain.  Pain is most consistent with myofascial strains that leads to muscle spasms.  Discussed MRIs of the cervical and thoracic spine with Mr. Barboza.  Discussed the options of doing nothing/living with it, physical therapy, strengthening, trigger point injections.  Mr. Barboza will contact this clinic when he has some back muscle spasms again.  At that point, we will get Mr. Barboza scheduled for trigger point injections for the muscle spasms.  Otherwise, Mr. All Barboza will follow-up in this clinic as needed.        Total Time on encounter:  25 minutes were spent on one more or more of the following:  discussion with patient, history, exam, coordinating care, treatment goals, record review, documenting clinical information, and/or data review.      Liam Mayen MD

## 2022-11-03 PROBLEM — M25.532 LEFT WRIST PAIN: Status: RESOLVED | Noted: 2022-09-23 | Resolved: 2022-11-03

## 2023-01-14 ENCOUNTER — HEALTH MAINTENANCE LETTER (OUTPATIENT)
Age: 33
End: 2023-01-14

## 2023-06-02 ENCOUNTER — HEALTH MAINTENANCE LETTER (OUTPATIENT)
Age: 33
End: 2023-06-02

## 2024-11-17 ENCOUNTER — HEALTH MAINTENANCE LETTER (OUTPATIENT)
Age: 34
End: 2024-11-17